# Patient Record
Sex: FEMALE | Race: BLACK OR AFRICAN AMERICAN | Employment: PART TIME | URBAN - METROPOLITAN AREA
[De-identification: names, ages, dates, MRNs, and addresses within clinical notes are randomized per-mention and may not be internally consistent; named-entity substitution may affect disease eponyms.]

---

## 2018-10-19 ENCOUNTER — OFFICE VISIT (OUTPATIENT)
Dept: INTERNAL MEDICINE CLINIC | Age: 38
End: 2018-10-19
Payer: COMMERCIAL

## 2018-10-19 VITALS
OXYGEN SATURATION: 98 % | TEMPERATURE: 98.2 F | RESPIRATION RATE: 16 BRPM | SYSTOLIC BLOOD PRESSURE: 132 MMHG | HEIGHT: 62 IN | WEIGHT: 164.4 LBS | BODY MASS INDEX: 30.25 KG/M2 | HEART RATE: 84 BPM | DIASTOLIC BLOOD PRESSURE: 76 MMHG

## 2018-10-19 DIAGNOSIS — K59.00 CONSTIPATION, UNSPECIFIED CONSTIPATION TYPE: ICD-10-CM

## 2018-10-19 DIAGNOSIS — Z13.9 ENCOUNTER FOR HEALTH-RELATED SCREENING: ICD-10-CM

## 2018-10-19 DIAGNOSIS — B00.1 COLD SORE: ICD-10-CM

## 2018-10-19 DIAGNOSIS — R61 HYPERHIDROSIS: ICD-10-CM

## 2018-10-19 DIAGNOSIS — K57.30 DIVERTICULOSIS OF LARGE INTESTINE WITHOUT HEMORRHAGE: ICD-10-CM

## 2018-10-19 DIAGNOSIS — L30.9 ECZEMA, UNSPECIFIED TYPE: Primary | ICD-10-CM

## 2018-10-19 PROCEDURE — 99203 OFFICE O/P NEW LOW 30 MIN: CPT | Performed by: INTERNAL MEDICINE

## 2018-10-19 RX ORDER — ACYCLOVIR 800 MG/1
800 TABLET ORAL 2 TIMES DAILY
Qty: 10 TABLET | Refills: 5 | Status: SHIPPED | OUTPATIENT
Start: 2018-10-19 | End: 2019-10-30 | Stop reason: SDUPTHER

## 2018-10-19 RX ORDER — ACYCLOVIR 50 MG/G
OINTMENT TOPICAL
Qty: 30 G | Refills: 1 | Status: SHIPPED | OUTPATIENT
Start: 2018-10-19 | End: 2021-09-27

## 2018-10-19 ASSESSMENT — ENCOUNTER SYMPTOMS
FLATUS: 0
VOMITING: 0
RESPIRATORY NEGATIVE: 1
NAUSEA: 0
DIARRHEA: 0
ABDOMINAL PAIN: 1
CONSTIPATION: 1
EYES NEGATIVE: 1
BACK PAIN: 1
BLOATING: 1
ALLERGIC/IMMUNOLOGIC NEGATIVE: 1
HEMATOCHEZIA: 0
RECTAL PAIN: 0

## 2018-10-19 ASSESSMENT — PATIENT HEALTH QUESTIONNAIRE - PHQ9
SUM OF ALL RESPONSES TO PHQ9 QUESTIONS 1 & 2: 0
2. FEELING DOWN, DEPRESSED OR HOPELESS: 0
SUM OF ALL RESPONSES TO PHQ QUESTIONS 1-9: 0
1. LITTLE INTEREST OR PLEASURE IN DOING THINGS: 0
SUM OF ALL RESPONSES TO PHQ QUESTIONS 1-9: 0

## 2018-10-19 NOTE — PATIENT INSTRUCTIONS
Patient Education        Learning About Diverticulosis and Diverticulitis  What are diverticulosis and diverticulitis? In diverticulosis and diverticulitis, pouches called diverticula form in the wall of the large intestine, or colon. · In diverticulosis, the pouches do not cause any pain or other symptoms. · In diverticulitis, the pouches get inflamed or infected and cause symptoms. Doctors aren't sure what causes these pouches in the colon. But they think that a low-fiber diet may play a role. Without fiber to add bulk to the stool, the colon has to work harder than normal to push the stool forward. The pressure from this may cause pouches to form in weak spots along the colon. Some people with diverticulosis get diverticulitis. But experts don't know why this happens. What are the symptoms? · In diverticulosis, most people don't have symptoms. But pouches sometimes bleed. · In diverticulitis, symptoms may last from a few hours to a week or more. They include:  ¨ Belly pain. This is usually in the lower left side. It is sometimes worse when you move. This is the most common symptom. ¨ Fever and chills. ¨ Bloating and gas. ¨ Diarrhea or constipation. ¨ Nausea and sometimes vomiting. ¨ Not feeling like eating. How can you prevent these problems? You may be able to lower your chance of getting diverticulitis. You can do this by taking steps to prevent constipation. · Eat fruits, vegetables, beans, and whole grains every day. These foods are high in fiber. · Drink plenty of fluids (enough so that your urine is light yellow or clear like water). If you have kidney, heart, or liver disease and have to limit fluids, talk with your doctor before you increase the amount of fluids you drink. · Get at least 30 minutes of exercise on most days of the week. Walking is a good choice. You also may want to do other activities, such as running, swimming, cycling, or playing tennis or team sports.   · Take a bleeding or blood-clotting disorder;  · diabetes;  · a gastrointestinal obstruction;  · a hormone sensitive cancer or condition;  · high triglyceride levels; or  · high or low blood pressure. Flax is considered likely unsafe to use during pregnancy. It is not known whether flax passes into breast milk or if it could harm a nursing baby. Do not use this product without medical advice if you are breast-feeding a baby. Do not give any herbal/health supplement to a child without medical advice. How should I take flax? When considering the use of herbal supplements, seek the advice of your doctor. You may also consider consulting a practitioner who is trained in the use of herbal/health supplements. If you choose to use flax, use it as directed on the package or as directed by your doctor, pharmacist, or other healthcare provider. Do not use more of this product than is recommended on the label. Do not use different formulations of flax (such as tablets, liquids, and others) at the same time, unless specifically directed to do so by a health care professional. Using different formulations together increases the risk of an overdose. Call your doctor if the condition you are treating with flax does not improve, or if it gets worse while using this product. Flax can affect blood-clotting and may increase your risk of bleeding. If you need surgery, dental work, or a medical procedure, stop taking flax at least 2 weeks ahead of time. Store at room temperature away from moisture and heat. Drink plenty of water each day while you are taking this product. What happens if I miss a dose? Skip the missed dose if it is almost time for your next scheduled dose. Do not use extra flax to make up the missed dose. What happens if I overdose? Seek emergency medical attention or call the Poison Help line at 1-108.810.1866. What should I avoid while taking flax?   Follow your healthcare provider's instructions about any allergic reactions, or adverse effects. If you have questions about the drugs you are taking, check with your doctor, nurse or pharmacist.  Copyright 0334-3759 [address]. Version: 5.01. Revision date: 8/3/2015. Care instructions adapted under license by [facility] ([facility]). If you have questions about a medical condition or this instruction, always ask your healthcare professional. [place] [address] any warranty or liability for your use of this information.

## 2018-10-22 LAB
HERPES TYPE 1/2 IGM COMBINED: 0.64 IV
HERPES TYPE I/II IGG COMBINED: >22.4 IV

## 2018-10-25 ENCOUNTER — PATIENT MESSAGE (OUTPATIENT)
Dept: INTERNAL MEDICINE CLINIC | Age: 38
End: 2018-10-25

## 2018-10-31 NOTE — TELEPHONE ENCOUNTER
Last OV   10/19/2018   New Pt visit       Dr Deno Dakin I see Karoline colace but no mirlax on med sheet

## 2018-11-01 RX ORDER — POLYETHYLENE GLYCOL 3350 17 G/17G
17 POWDER, FOR SOLUTION ORAL DAILY
Qty: 510 G | Refills: 11 | Status: SHIPPED | OUTPATIENT
Start: 2018-11-01 | End: 2018-12-01

## 2018-12-07 ENCOUNTER — TELEPHONE (OUTPATIENT)
Dept: INTERNAL MEDICINE CLINIC | Age: 38
End: 2018-12-07

## 2019-01-04 ENCOUNTER — OFFICE VISIT (OUTPATIENT)
Dept: INTERNAL MEDICINE CLINIC | Age: 39
End: 2019-01-04
Payer: COMMERCIAL

## 2019-01-04 VITALS
OXYGEN SATURATION: 98 % | SYSTOLIC BLOOD PRESSURE: 116 MMHG | HEART RATE: 86 BPM | DIASTOLIC BLOOD PRESSURE: 78 MMHG | BODY MASS INDEX: 30.36 KG/M2 | WEIGHT: 166 LBS | TEMPERATURE: 98 F

## 2019-01-04 DIAGNOSIS — J01.01 ACUTE RECURRENT MAXILLARY SINUSITIS: Primary | ICD-10-CM

## 2019-01-04 PROCEDURE — 99213 OFFICE O/P EST LOW 20 MIN: CPT | Performed by: INTERNAL MEDICINE

## 2019-01-04 RX ORDER — CEFDINIR 300 MG/1
300 CAPSULE ORAL 2 TIMES DAILY
Qty: 20 CAPSULE | Refills: 0 | Status: SHIPPED | OUTPATIENT
Start: 2019-01-04 | End: 2019-01-14

## 2019-01-04 RX ORDER — FLUCONAZOLE 150 MG/1
150 TABLET ORAL ONCE
Qty: 3 TABLET | Refills: 0 | Status: SHIPPED | OUTPATIENT
Start: 2019-01-04 | End: 2019-01-04

## 2019-01-04 RX ORDER — FLUTICASONE PROPIONATE 50 MCG
2 SPRAY, SUSPENSION (ML) NASAL DAILY
Qty: 1 BOTTLE | Refills: 2 | Status: SHIPPED | OUTPATIENT
Start: 2019-01-04 | End: 2019-07-30 | Stop reason: SDUPTHER

## 2019-01-04 ASSESSMENT — ENCOUNTER SYMPTOMS
VOMITING: 0
SORE THROAT: 0
ABDOMINAL PAIN: 0
DIARRHEA: 0
RHINORRHEA: 0
CONSTIPATION: 0
COUGH: 1
SHORTNESS OF BREATH: 0
SINUS PRESSURE: 1
BACK PAIN: 0
WHEEZING: 0
CHEST TIGHTNESS: 0
NAUSEA: 0
EYE REDNESS: 0

## 2019-06-06 DIAGNOSIS — L30.9 ECZEMA, UNSPECIFIED TYPE: ICD-10-CM

## 2019-07-30 ENCOUNTER — OFFICE VISIT (OUTPATIENT)
Dept: INTERNAL MEDICINE CLINIC | Age: 39
End: 2019-07-30
Payer: COMMERCIAL

## 2019-07-30 VITALS
HEIGHT: 61 IN | WEIGHT: 156 LBS | BODY MASS INDEX: 29.45 KG/M2 | SYSTOLIC BLOOD PRESSURE: 126 MMHG | OXYGEN SATURATION: 98 % | HEART RATE: 68 BPM | DIASTOLIC BLOOD PRESSURE: 78 MMHG

## 2019-07-30 DIAGNOSIS — Z02.0 SCHOOL PHYSICAL EXAM: Primary | ICD-10-CM

## 2019-07-30 DIAGNOSIS — J01.01 ACUTE RECURRENT MAXILLARY SINUSITIS: ICD-10-CM

## 2019-07-30 DIAGNOSIS — L30.9 ECZEMA, UNSPECIFIED TYPE: ICD-10-CM

## 2019-07-30 PROCEDURE — 99396 PREV VISIT EST AGE 40-64: CPT | Performed by: NURSE PRACTITIONER

## 2019-07-30 RX ORDER — FLUTICASONE PROPIONATE 50 MCG
2 SPRAY, SUSPENSION (ML) NASAL DAILY
Qty: 1 BOTTLE | Refills: 2 | Status: SHIPPED | OUTPATIENT
Start: 2019-07-30 | End: 2021-02-02 | Stop reason: SDUPTHER

## 2019-07-30 ASSESSMENT — PATIENT HEALTH QUESTIONNAIRE - PHQ9
1. LITTLE INTEREST OR PLEASURE IN DOING THINGS: 0
SUM OF ALL RESPONSES TO PHQ QUESTIONS 1-9: 0
SUM OF ALL RESPONSES TO PHQ9 QUESTIONS 1 & 2: 0
SUM OF ALL RESPONSES TO PHQ QUESTIONS 1-9: 0
2. FEELING DOWN, DEPRESSED OR HOPELESS: 0

## 2019-07-30 ASSESSMENT — ENCOUNTER SYMPTOMS
EYES NEGATIVE: 1
GASTROINTESTINAL NEGATIVE: 1
ALLERGIC/IMMUNOLOGIC NEGATIVE: 1
RESPIRATORY NEGATIVE: 1

## 2019-07-30 NOTE — PROGRESS NOTES
Psychiatric: She has a normal mood and affect.  Her speech is normal and behavior is normal. Judgment and thought content normal. Cognition and memory are normal.       Assessment:      Well exam  Immunization check      Plan:     Continue to plenty of water  Avoid fast food          [address] [facility], [facility]

## 2019-08-01 LAB
RUBEOLA (MEASLES) AB IGG: 221 AU/ML
RUBEOLA IGM: 0.39 AU (ref 0–0.79)

## 2019-08-02 LAB
6-ACETYLMORPHINE: NOT DETECTED
7-AMINOCLONAZEPAM: NOT DETECTED
ALPHA-OH-ALPRAZOLAM: NOT DETECTED
ALPRAZOLAM: NOT DETECTED
AMPHETAMINE: NOT DETECTED
BARBITURATES: NOT DETECTED
BENZOYLECGONINE: NOT DETECTED
BUPRENORPHINE: NOT DETECTED
CARISOPRODOL: NOT DETECTED
CLONAZEPAM: NOT DETECTED
CODEINE: NOT DETECTED
CREATININE URINE: 256.1 MG/DL (ref 20–400)
DIAZEPAM: NOT DETECTED
DRUGS EXPECTED: NORMAL
EER PAIN MGT DRUG PANEL, HIGH RES/EMIT U: NORMAL
ETHYL GLUCURONIDE: PRESENT
FENTANYL: NOT DETECTED
HYDROCODONE: NOT DETECTED
HYDROMORPHONE: NOT DETECTED
LORAZEPAM: NOT DETECTED
MARIJUANA METABOLITE: NOT DETECTED
MDA: NOT DETECTED
MDEA: NOT DETECTED
MDMA URINE: NOT DETECTED
MEPERIDINE: NOT DETECTED
METHADONE: NOT DETECTED
METHAMPHETAMINE: NOT DETECTED
METHYLPHENIDATE: NOT DETECTED
MIDAZOLAM: NOT DETECTED
MORPHINE: NOT DETECTED
NORBUPRENORPHINE, FREE: NOT DETECTED
NORDIAZEPAM: NOT DETECTED
NORFENTANYL: NOT DETECTED
NORHYDROCODONE, URINE: NOT DETECTED
NOROXYCODONE: NOT DETECTED
NOROXYMORPHONE, URINE: NOT DETECTED
OXAZEPAM: NOT DETECTED
OXYCODONE: NOT DETECTED
OXYMORPHONE: NOT DETECTED
PAIN MANAGEMENT DRUG PANEL: NORMAL
PAIN MANAGEMENT DRUG PANEL: NORMAL
PCP: NOT DETECTED
PHENTERMINE: NOT DETECTED
PROPOXYPHENE: NOT DETECTED
TAPENTADOL, URINE: NOT DETECTED
TAPENTADOL-O-SULFATE, URINE: NOT DETECTED
TEMAZEPAM: NOT DETECTED
TRAMADOL: NOT DETECTED
ZOLPIDEM: NOT DETECTED

## 2019-08-08 ENCOUNTER — TELEPHONE (OUTPATIENT)
Dept: INTERNAL MEDICINE CLINIC | Age: 39
End: 2019-08-08

## 2019-08-08 DIAGNOSIS — Z01.84 IMMUNITY STATUS TESTING: Primary | ICD-10-CM

## 2019-08-12 DIAGNOSIS — Z01.84 IMMUNITY STATUS TESTING: ICD-10-CM

## 2019-08-12 LAB — HEPATITIS B CORE IGM ANTIBODY: NORMAL

## 2019-08-20 DIAGNOSIS — Z01.84 IMMUNITY STATUS TESTING: Primary | ICD-10-CM

## 2019-08-21 DIAGNOSIS — Z01.84 IMMUNITY STATUS TESTING: ICD-10-CM

## 2019-08-24 LAB — VARICELLA ZOSTER AB IGM: 0.13 ISR

## 2019-10-30 ENCOUNTER — PATIENT MESSAGE (OUTPATIENT)
Dept: INTERNAL MEDICINE CLINIC | Age: 39
End: 2019-10-30

## 2019-10-30 DIAGNOSIS — B00.1 COLD SORE: ICD-10-CM

## 2019-10-30 DIAGNOSIS — L30.9 ECZEMA, UNSPECIFIED TYPE: ICD-10-CM

## 2019-10-30 RX ORDER — ACYCLOVIR 800 MG/1
800 TABLET ORAL 2 TIMES DAILY
Qty: 10 TABLET | Refills: 5 | Status: SHIPPED | OUTPATIENT
Start: 2019-10-30 | End: 2019-11-04

## 2019-12-09 ENCOUNTER — TELEPHONE (OUTPATIENT)
Dept: INTERNAL MEDICINE CLINIC | Age: 39
End: 2019-12-09

## 2020-06-24 ENCOUNTER — NURSE TRIAGE (OUTPATIENT)
Dept: OTHER | Facility: CLINIC | Age: 40
End: 2020-06-24

## 2020-06-24 ENCOUNTER — OFFICE VISIT (OUTPATIENT)
Dept: PRIMARY CARE CLINIC | Age: 40
End: 2020-06-24

## 2020-06-24 PROCEDURE — 99211 OFF/OP EST MAY X REQ PHY/QHP: CPT | Performed by: INTERNAL MEDICINE

## 2020-06-24 NOTE — PATIENT INSTRUCTIONS
Advance Care Planning  People with COVID-19 may have no symptoms, mild symptoms, such as fever, cough, and shortness of breath or they may have more severe illness, developing severe and fatal pneumonia. As a result, Advance Care Planning with attention to naming a health care decision maker (someone you trust to make healthcare decisions for you if you could not speak for yourself) and sharing other health care preferences is important BEFORE a possible health crisis. Please contact your Primary Care Provider to discuss Advance Care Planning. Preventing the Spread of Coronavirus Disease 2019 in Homes and Residential Communities  For the most recent information go to [email or web address]    Prevention steps for People with confirmed or suspected COVID-19 (including persons under investigation) who do not need to be hospitalized  and   People with confirmed COVID-19 who were hospitalized and determined to be medically stable to go home    Your healthcare provider and public health staff will evaluate whether you can be cared for at home. If it is determined that you do not need to be hospitalized and can be isolated at home, you will be monitored by staff from your local or state health department. You should follow the prevention steps below until a healthcare provider or local or state health department says you can return to your normal activities. Stay home except to get medical care  People who are mildly ill with COVID-19 are able to isolate at home during their illness. You should restrict activities outside your home, except for getting medical care. Do not go to work, school, or public areas. Avoid using public transportation, ride-sharing, or taxis. Separate yourself from other people and animals in your home  People: As much as possible, you should stay in a specific room and away from other people in your home.  Also, you should use a separate bathroom, if available. Animals: You should restrict contact with pets and other animals while you are sick with COVID-19, just like you would around other people. Although there have not been reports of pets or other animals becoming sick with COVID-19, it is still recommended that people sick with COVID-19 limit contact with animals until more information is known about the virus. When possible, have another member of your household care for your animals while you are sick. If you are sick with COVID-19, avoid contact with your pet, including petting, snuggling, being kissed or licked, and sharing food. If you must care for your pet or be around animals while you are sick, wash your hands before and after you interact with pets and wear a facemask. Call ahead before visiting your doctor  If you have a medical appointment, call the healthcare provider and tell them that you have or may have COVID-19. This will help the healthcare providers office take steps to keep other people from getting infected or exposed. Wear a facemask  You should wear a facemask when you are around other people (e.g., sharing a room or vehicle) or pets and before you enter a healthcare providers office. If you are not able to wear a facemask (for example, because it causes trouble breathing), then people who live with you should not stay in the same room with you, or they should wear a facemask if they enter your room. Cover your coughs and sneezes  Cover your mouth and nose with a tissue when you cough or sneeze. Throw used tissues in a lined trash can. Immediately wash your hands with soap and water for at least 20 seconds or, if soap and water are not available, clean your hands with an alcohol-based hand  that contains at least 60% alcohol.   Clean your hands often  Wash your hands often with soap and water for at least 20 seconds, especially after blowing your nose, coughing, or sneezing; going to the bathroom; and have a medical emergency and need to call 911, notify the dispatch personnel that you have, or are being evaluated for COVID-19. If possible, put on a facemask before emergency medical services arrive. Discontinuing home isolation  Patients with confirmed COVID-19 should remain under home isolation precautions until the risk of secondary transmission to others is thought to be low. The decision to discontinue home isolation precautions should be made on a case-by-case basis, in consultation with healthcare providers and state and local health departments. Thank you for enrolling in [address] Please follow the instructions below to securely access your online medical record. [employer] allows you to send messages to your doctor, view your test results, renew your prescriptions, schedule appointments, and more. How Do I Sign Up? 1. In your Internet browser, go to [email or web address]  2. Click on the Sign Up Now link in the Sign In box. You will see the New Member Sign Up page. 3. Enter your [employer] Access Code exactly as it appears below. You will not need to use this code after youve completed the sign-up process. If you do not sign up before the expiration date, you must request a new code. [employer] Access Code: Activation code not generated  Current [employer] Status: Active    4. Enter your Social Security Number ([id number]) and Date of Birth (mm/dd/yyyy) as indicated and click Submit. You will be taken to the next sign-up page. 5. Create a [employer] ID. This will be your [employer] login ID and cannot be changed, so think of one that is secure and easy to remember. 6. Create a [employer] password. You can change your password at any time. 7. Enter your Password Reset Question and Answer. This can be used at a later time if you forget your password. 8. Enter your e-mail address. You will receive e-mail notification when new information is available in [address] 9. Click Sign Up. You can now view your medical record. Additional Information  If you have questions, please contact your physician practice where you receive care. Remember, [employer] is NOT to be used for urgent needs. For medical emergencies, dial 911.

## 2020-06-26 LAB
SARS-COV-2: NOT DETECTED
SOURCE: NORMAL

## 2021-02-02 ENCOUNTER — OFFICE VISIT (OUTPATIENT)
Dept: INTERNAL MEDICINE CLINIC | Age: 41
End: 2021-02-02
Payer: COMMERCIAL

## 2021-02-02 VITALS
OXYGEN SATURATION: 99 % | HEIGHT: 62 IN | WEIGHT: 170 LBS | HEART RATE: 93 BPM | DIASTOLIC BLOOD PRESSURE: 76 MMHG | TEMPERATURE: 97.8 F | BODY MASS INDEX: 31.28 KG/M2 | SYSTOLIC BLOOD PRESSURE: 132 MMHG

## 2021-02-02 DIAGNOSIS — I73.00 RAYNAUD'S PHENOMENON WITHOUT GANGRENE: ICD-10-CM

## 2021-02-02 DIAGNOSIS — B00.1 RECURRENT COLD SORES: ICD-10-CM

## 2021-02-02 DIAGNOSIS — Z13.220 SCREENING FOR HYPERLIPIDEMIA: ICD-10-CM

## 2021-02-02 DIAGNOSIS — Z23 NEED FOR PROPHYLACTIC VACCINATION AGAINST STREPTOCOCCUS PNEUMONIAE (PNEUMOCOCCUS): ICD-10-CM

## 2021-02-02 DIAGNOSIS — Z00.00 WELL ADULT EXAM: Primary | ICD-10-CM

## 2021-02-02 DIAGNOSIS — Z12.31 ENCOUNTER FOR SCREENING MAMMOGRAM FOR BREAST CANCER: ICD-10-CM

## 2021-02-02 DIAGNOSIS — Z13.1 ENCOUNTER FOR SCREENING FOR DIABETES MELLITUS: ICD-10-CM

## 2021-02-02 DIAGNOSIS — Z11.4 SCREENING FOR HIV WITHOUT PRESENCE OF RISK FACTORS: ICD-10-CM

## 2021-02-02 DIAGNOSIS — R61 HYPERHIDROSIS: ICD-10-CM

## 2021-02-02 DIAGNOSIS — Z72.89 OTHER PROBLEMS RELATED TO LIFESTYLE: ICD-10-CM

## 2021-02-02 DIAGNOSIS — L30.9 ECZEMA, UNSPECIFIED TYPE: ICD-10-CM

## 2021-02-02 DIAGNOSIS — J01.01 ACUTE RECURRENT MAXILLARY SINUSITIS: ICD-10-CM

## 2021-02-02 LAB
BASOPHILS ABSOLUTE: 0 K/UL (ref 0–0.2)
BASOPHILS RELATIVE PERCENT: 0.2 %
CHOLESTEROL, TOTAL: 199 MG/DL (ref 0–199)
EOSINOPHILS ABSOLUTE: 0.1 K/UL (ref 0–0.6)
EOSINOPHILS RELATIVE PERCENT: 1.3 %
GLUCOSE FASTING: 86 MG/DL (ref 70–99)
HCT VFR BLD CALC: 42.8 % (ref 36–48)
HDLC SERPL-MCNC: 67 MG/DL (ref 40–60)
HEMOGLOBIN: 14.2 G/DL (ref 12–16)
HEPATITIS C ANTIBODY INTERPRETATION: NORMAL
LDL CHOLESTEROL CALCULATED: 119 MG/DL
LYMPHOCYTES ABSOLUTE: 2.6 K/UL (ref 1–5.1)
LYMPHOCYTES RELATIVE PERCENT: 29.9 %
MCH RBC QN AUTO: 33.7 PG (ref 26–34)
MCHC RBC AUTO-ENTMCNC: 33.1 G/DL (ref 31–36)
MCV RBC AUTO: 101.9 FL (ref 80–100)
MONOCYTES ABSOLUTE: 0.6 K/UL (ref 0–1.3)
MONOCYTES RELATIVE PERCENT: 6.4 %
NEUTROPHILS ABSOLUTE: 5.4 K/UL (ref 1.7–7.7)
NEUTROPHILS RELATIVE PERCENT: 62.2 %
PDW BLD-RTO: 13.6 % (ref 12.4–15.4)
PLATELET # BLD: 292 K/UL (ref 135–450)
PMV BLD AUTO: 8.8 FL (ref 5–10.5)
RBC # BLD: 4.2 M/UL (ref 4–5.2)
TRIGL SERPL-MCNC: 63 MG/DL (ref 0–150)
TSH REFLEX FT4: 0.82 UIU/ML (ref 0.27–4.2)
VLDLC SERPL CALC-MCNC: 13 MG/DL
WBC # BLD: 8.7 K/UL (ref 4–11)

## 2021-02-02 PROCEDURE — 99213 OFFICE O/P EST LOW 20 MIN: CPT | Performed by: INTERNAL MEDICINE

## 2021-02-02 PROCEDURE — 99396 PREV VISIT EST AGE 40-64: CPT | Performed by: INTERNAL MEDICINE

## 2021-02-02 RX ORDER — ACYCLOVIR 800 MG/1
800 TABLET ORAL 2 TIMES DAILY
Qty: 20 TABLET | Refills: 0 | Status: SHIPPED | OUTPATIENT
Start: 2021-02-02 | End: 2021-09-27

## 2021-02-02 RX ORDER — AMLODIPINE BESYLATE 5 MG/1
5 TABLET ORAL DAILY
Qty: 30 TABLET | Refills: 0 | Status: SHIPPED | OUTPATIENT
Start: 2021-02-02 | End: 2021-03-10

## 2021-02-02 RX ORDER — ACYCLOVIR 800 MG/1
800 TABLET ORAL 2 TIMES DAILY
COMMUNITY
End: 2021-02-02 | Stop reason: SDUPTHER

## 2021-02-02 RX ORDER — FLUTICASONE PROPIONATE 50 MCG
2 SPRAY, SUSPENSION (ML) NASAL DAILY
Qty: 1 BOTTLE | Refills: 2 | Status: SHIPPED | OUTPATIENT
Start: 2021-02-02 | End: 2021-09-27

## 2021-02-02 SDOH — SOCIAL STABILITY: SOCIAL NETWORK: HOW OFTEN DO YOU ATTEND CHURCH OR RELIGIOUS SERVICES?: 1 TO 4 TIMES PER YEAR

## 2021-02-02 SDOH — SOCIAL STABILITY: SOCIAL NETWORK: ARE YOU MARRIED, WIDOWED, DIVORCED, SEPARATED, NEVER MARRIED, OR LIVING WITH A PARTNER?: MARRIED

## 2021-02-02 SDOH — ECONOMIC STABILITY: TRANSPORTATION INSECURITY
IN THE PAST 12 MONTHS, HAS LACK OF TRANSPORTATION KEPT YOU FROM MEETINGS, WORK, OR FROM GETTING THINGS NEEDED FOR DAILY LIVING?: NO

## 2021-02-02 SDOH — ECONOMIC STABILITY: INCOME INSECURITY: HOW HARD IS IT FOR YOU TO PAY FOR THE VERY BASICS LIKE FOOD, HOUSING, MEDICAL CARE, AND HEATING?: NOT HARD AT ALL

## 2021-02-02 SDOH — HEALTH STABILITY: MENTAL HEALTH
STRESS IS WHEN SOMEONE FEELS TENSE, NERVOUS, ANXIOUS, OR CAN'T SLEEP AT NIGHT BECAUSE THEIR MIND IS TROUBLED. HOW STRESSED ARE YOU?: TO SOME EXTENT

## 2021-02-02 SDOH — SOCIAL STABILITY: SOCIAL NETWORK: HOW OFTEN DO YOU ATTENT MEETINGS OF THE CLUB OR ORGANIZATION YOU BELONG TO?: 1 TO 4 TIMES PER YEAR

## 2021-02-02 SDOH — SOCIAL STABILITY: SOCIAL INSECURITY
WITHIN THE LAST YEAR, HAVE YOU BEEN KICKED, HIT, SLAPPED, OR OTHERWISE PHYSICALLY HURT BY YOUR PARTNER OR EX-PARTNER?: NO

## 2021-02-02 SDOH — HEALTH STABILITY: PHYSICAL HEALTH: ON AVERAGE, HOW MANY MINUTES DO YOU ENGAGE IN EXERCISE AT THIS LEVEL?: 60 MIN

## 2021-02-02 SDOH — ECONOMIC STABILITY: FOOD INSECURITY: WITHIN THE PAST 12 MONTHS, THE FOOD YOU BOUGHT JUST DIDN'T LAST AND YOU DIDN'T HAVE MONEY TO GET MORE.: NEVER TRUE

## 2021-02-02 SDOH — HEALTH STABILITY: PHYSICAL HEALTH: ON AVERAGE, HOW MANY DAYS PER WEEK DO YOU ENGAGE IN MODERATE TO STRENUOUS EXERCISE (LIKE A BRISK WALK)?: 5 DAYS

## 2021-02-02 SDOH — ECONOMIC STABILITY: FOOD INSECURITY: WITHIN THE PAST 12 MONTHS, YOU WORRIED THAT YOUR FOOD WOULD RUN OUT BEFORE YOU GOT MONEY TO BUY MORE.: NEVER TRUE

## 2021-02-02 ASSESSMENT — PATIENT HEALTH QUESTIONNAIRE - PHQ9
SUM OF ALL RESPONSES TO PHQ QUESTIONS 1-9: 0
SUM OF ALL RESPONSES TO PHQ QUESTIONS 1-9: 0

## 2021-02-02 ASSESSMENT — ENCOUNTER SYMPTOMS
EYES NEGATIVE: 1
ALLERGIC/IMMUNOLOGIC NEGATIVE: 1
RESPIRATORY NEGATIVE: 1
ABDOMINAL PAIN: 1

## 2021-02-02 NOTE — PROGRESS NOTES
Subjective:      Patient ID: Leti Alba is a 38 y.o. female. Well Adult Physical   Patient here for a comprehensive physical exam.The patient reports problems -  has a past medical history of Diverticulosis and Hypertension. Leti Alba is a 38 y.o. female  evaluation of complaint of pain in her bilateral lower extremities. Symptoms have been present for several months. Symptoms include numbness, pain and toes change colors  and hands are not affected are of moderate severity. Onset was gradual.  Patient denies numbness and pain. Symptoms are made worse by: cold exposure. Symptoms are helped by heat and movement and heavy objects. Associated symptoms include none. Patient denies associated arthralgia, depression, fatigue, fevers, joint pain, memory loss, morning stiffness, oral ulcers and polyuria. Patient taking medications thought to exacerbate Raynaud's: none. Do you take any herbs or supplements that were not prescribed by a doctor? yes Are you taking calcium supplements? no Are you taking aspirin daily? no   History:  Pap smear schedule for this month.     Past Medical History:   Diagnosis Date    Diverticulosis     Hypertension      Past Surgical History:   Procedure Laterality Date     SECTION      OTHER SURGICAL HISTORY      had mirena placed which ruptured from the uterus and had surgery to remove through belly botton     Family History   Problem Relation Age of Onset    High Blood Pressure Mother     High Cholesterol Mother     Other Mother         thyroid problens    High Blood Pressure Father     High Cholesterol Father     High Blood Pressure Sister     High Cholesterol Sister     Depression Sister     Diabetes Brother     Kidney Disease Brother     Other Paternal Grandfather         alzheimers    Heart Disease Maternal Grandfather      Social History     Socioeconomic History    Marital status: Single     Spouse name: Not on file    Number of children: Not on file    Years of education: Not on file    Highest education level: Not on file   Occupational History    Occupation: [employer]   Social Needs    Financial resource strain: Not on file    Food insecurity     Worry: Not on file     Inability: Not on file   [employer] needs     Medical: Not on file     Non-medical: Not on file   Tobacco Use    Smoking status: Never Smoker    Smokeless tobacco: Never Used   Substance and Sexual Activity    Alcohol use: Yes     Comment: occasionally    Drug use: No    Sexual activity: Yes     Partners: Male     Birth control/protection: OCP   Lifestyle    Physical activity     Days per week: Not on file     Minutes per session: Not on file    Stress: Not on file   Relationships    Social connections     Talks on phone: Not on file     Gets together: Not on file     Attends [employer] service: Not on file     Active member of club or organization: Not on file     Attends meetings of clubs or organizations: Not on file     Relationship status: Not on file    Intimate partner violence     Fear of current or ex partner: Not on file     Emotionally abused: Not on file     Physically abused: Not on file     Forced sexual activity: Not on file   Other Topics Concern    Not on file   Social History Narrative    Lives home with  and 3 babies ( daughter 8,  Son 9, 20 month son). Review of Systems   Constitutional: Negative. Negative for diaphoresis and fatigue. HENT: Negative. Eyes: Negative. Respiratory: Negative. Cardiovascular: Negative. Gastrointestinal: Positive for abdominal pain. Endocrine: Negative. Genitourinary: Negative. Negative for difficulty urinating, dyspareunia, dysuria, enuresis, flank pain, frequency, genital sores, hematuria and menstrual problem. Musculoskeletal: Positive for gait problem and joint swelling. Skin: Negative. Allergic/Immunologic: Negative. Hematological: Negative. Psychiatric/Behavioral: Negative. Vitals:    02/02/21 1005   BP: 132/76   Pulse: 93   Temp: 97.8 °F (36.6 °C)   SpO2: 99%     . BP Readings from Last 3 Encounters:   02/02/21 132/76   07/30/19 126/78   01/04/19 116/78     Wt Readings from Last 3 Encounters:   02/02/21 170 lb (77.1 kg)   07/30/19 156 lb (70.8 kg)   01/04/19 166 lb (75.3 kg)     Past Medical History:   Diagnosis Date    Diverticulosis     Hypertension      Family History   Problem Relation Age of Onset    High Blood Pressure Mother     High Cholesterol Mother     Other Mother         thyroid problens    High Blood Pressure Father     High Cholesterol Father     High Blood Pressure Sister     High Cholesterol Sister     Depression Sister     Diabetes Brother     Kidney Disease Brother     Other Paternal Grandfather         alzheimers    Heart Disease Maternal Grandfather      Objective:   Physical Exam  Constitutional:       Appearance: Normal appearance. She is well-developed. HENT:      Head: Normocephalic. Right Ear: Hearing, tympanic membrane, ear canal and external ear normal.      Left Ear: Hearing, tympanic membrane, ear canal and external ear normal.      Nose: Nose normal.      Right Sinus: No maxillary sinus tenderness or frontal sinus tenderness. Left Sinus: No maxillary sinus tenderness or frontal sinus tenderness. Mouth/Throat:      Pharynx: Uvula midline. Eyes:      General: Lids are normal. No scleral icterus. Right eye: No foreign body, discharge or hordeolum. Left eye: No foreign body, discharge or hordeolum. Conjunctiva/sclera: Conjunctivae normal.      Right eye: No chemosis or exudate. Left eye: No chemosis or exudate. Pupils: Pupils are equal, round, and reactive to light. Neck:      Musculoskeletal: Full passive range of motion without pain. Thyroid: No thyroid mass or thyromegaly.       Trachea: Trachea normal.   Cardiovascular:      Rate and Rhythm: Normal rate and regular rhythm. Heart sounds: Normal heart sounds. Pulmonary:      Effort: Pulmonary effort is normal.      Breath sounds: Normal breath sounds. Abdominal:      General: Bowel sounds are normal.      Palpations: Abdomen is soft. There is no hepatomegaly or splenomegaly. Tenderness: There is abdominal tenderness in the right lower quadrant. Hernia: No hernia is present. There is no hernia in the ventral area or left inguinal area. Comments: tenderness   Musculoskeletal: Normal range of motion. Lymphadenopathy:      Head:      Right side of head: No submental, submandibular, tonsillar, preauricular, posterior auricular or occipital adenopathy. Left side of head: No submental, submandibular, tonsillar, preauricular, posterior auricular or occipital adenopathy. Cervical: No cervical adenopathy. Skin:     General: Skin is warm and dry. Neurological:      Mental Status: She is alert and oriented to person, place, and time. GCS: GCS eye subscore is 4. GCS verbal subscore is 5. GCS motor subscore is 6. Cranial Nerves: No cranial nerve deficit. Sensory: No sensory deficit. Deep Tendon Reflexes: Reflexes are normal and symmetric. Reflex Scores:       Tricep reflexes are 2+ on the right side and 2+ on the left side. Bicep reflexes are 2+ on the right side and 2+ on the left side. Brachioradialis reflexes are 2+ on the right side and 2+ on the left side. Patellar reflexes are 2+ on the right side and 2+ on the left side. Achilles reflexes are 2+ on the right side and 2+ on the left side. Psychiatric:         Speech: Speech normal.         Behavior: Behavior normal.         Thought Content:  Thought content normal.         Judgment: Judgment normal.           .Assessment/Plan:  Brooke Murray was seen today for annual exam.    Diagnoses and all orders for this visit:    Well adult exam-Anticipatory Guidance  Injury Prevention  Lap-shoulder belts, Smoke detectors, Carbon monoxide detectors, Safe storage and handling of firearms; removal if appropriate and  Occupational risk counseling  Substance Abuse  1. Tobacco cessation or never starting to include pharmacotherapy, social support for cessation, and skills training/problem solving. Avoid alcohol/drug use while driving, swimming, boating, using firearms, etc.   Sexual Behavior  1. STD prevention; abstinence; avoid high-risk behavior; condoms/female barrier with spermicide,  Contraception   Diet and Exercise   Limit fat and cholesterol; maintain caloric balance; emphasized grains, fruits and vegetables. Adequate calcium and vitamin D intake (females); add foods rich in calcium; supplement as needed. Regular physical activity at least 150 minutes per week to maintain activity   Protection from UV Light  Abuse and Violence: violence prevention at home, school and in social situations  Dental Health: Regular visits to dental health provider    Eczema, unspecified type  -     triamcinolone (KENALOG) 0.1 % ointment; APPLY TO AFFECTED AREA(S) TWO TIMES A DAY FOR ECZEMA    Acute recurrent maxillary sinusitis  -     fluticasone (FLONASE) 50 MCG/ACT nasal spray; 2 sprays by Each Nostril route daily    Hyperhidrosis  -     aluminum chloride (DRYSOL) 20 % external solution; Apply topically nightly. Other problems related to lifestyle  -     Hepatitis C Antibody; Future    Screening for HIV without presence of risk factors  -     HIV Screen; Future    Screening for hyperlipidemia  -     Lipid Panel; Future  -     Cancel: Lipid Panel; Future    Encounter for screening for diabetes mellitus  -     Glucose, Fasting;  Future  -     Hemoglobin A1C - NOT COVERED /DO NOT USE FOR MEDICARE PATIENTS; Future    Need for prophylactic vaccination against Streptococcus pneumoniae (pneumococcus)    Raynaud's phenomenon without gangrene  -     TSH WITH REFLEX TO FT4; Future  -     DAISHA Reflex to Antibody Cascade; Future  -     CBC WITH AUTO DIFFERENTIAL; Future  -     Cancel: DAISHA Reflex to Antibody Cascade; Future  -     amLODIPine (NORVASC) 5 MG tablet; Take 1 tablet by mouth daily    Recurrent cold sores  -     acyclovir (ZOVIRAX) 800 MG tablet; Take 1 tablet by mouth 2 times daily for 10 days    Encounter for screening mammogram for breast cancer  -     [facility] DIGITAL SCREEN W OR WO CAD BILATERAL; Future      Return in about 6 weeks (around 3/16/2021) for leg pain.       Lita Helms MD

## 2021-02-03 ENCOUNTER — TELEPHONE (OUTPATIENT)
Dept: INTERNAL MEDICINE CLINIC | Age: 41
End: 2021-02-03

## 2021-02-03 DIAGNOSIS — Z12.39 BREAST CANCER SCREENING, HIGH RISK PATIENT: Primary | ICD-10-CM

## 2021-02-03 LAB
ANTI-NUCLEAR ANTIBODY (ANA): NEGATIVE
ESTIMATED AVERAGE GLUCOSE: 96.8 MG/DL
HBA1C MFR BLD: 5 %
HIV AG/AB: NORMAL
HIV ANTIGEN: NORMAL
HIV-1 ANTIBODY: NORMAL
HIV-2 AB: NORMAL

## 2021-02-03 NOTE — TELEPHONE ENCOUNTER
Patient received an order for her first mammogram. She has 2 aunts that were diagnosed w/breast cancer last year.   She is asking for an order for a diagnostic instead of a screening mammogram.

## 2021-03-08 DIAGNOSIS — I73.00 RAYNAUD'S PHENOMENON WITHOUT GANGRENE: ICD-10-CM

## 2021-03-10 RX ORDER — AMLODIPINE BESYLATE 5 MG/1
TABLET ORAL
Qty: 30 TABLET | Refills: 5 | Status: SHIPPED
Start: 2021-03-10 | End: 2022-01-10 | Stop reason: CLARIF

## 2021-09-13 ENCOUNTER — TELEPHONE (OUTPATIENT)
Dept: INTERNAL MEDICINE CLINIC | Age: 41
End: 2021-09-13

## 2021-09-13 NOTE — TELEPHONE ENCOUNTER
Pt needs to schedule an appt as soon as possible for a rash on top of her foot that's getting worse. It's itchy, painful, and bleeding. Pt has tried aquaphor, rubbing it, wrapping it up, witch hazel, itch cream, and nothing is helping. 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

## 2021-09-13 NOTE — TELEPHONE ENCOUNTER
----- Message from [facility] sent at 9/9/2021 11:43 AM EDT -----  Subject: Appointment Request    Reason for Call: Semi-Routine Skin Problem    QUESTIONS  Type of Appointment? Established Patient  Reason for appointment request? Available appointments did not meet   patient need  Additional Information for Provider? Pt has a rash on both foot. would   like to be seen as soon as possible. VV will be okay.   ---------------------------------------------------------------------------  --------------  CALL BACK INFO  What is the best way for the office to contact you? OK to leave message on   voicemail  Preferred Call Back Phone Number? [phone]  ---------------------------------------------------------------------------  --------------  SCRIPT ANSWERS  Relationship to Patient? Self  Are you having swelling in your throat or face? No  Are you having difficulty breathing? No  Have the symptoms worsened or spread in the last day? No  Are you having fevers (100.4), chills or sweats? No  Have you recently (14 days) seen a provider for this issue? No  Have you been diagnosed with, awaiting test results for, or told that you   are suspected of having COVID-19 (Coronavirus)? (If patient has tested   negative or was tested as a requirement for work, school, or travel and   not based on symptoms, answer no)? No  Do you currently have flu-like symptoms including fever or chills, cough,   shortness of breath, difficulty breathing, or new loss of taste or smell? No  Have you had close contact with someone with COVID-19 in the last 14 days? No  (Service Expert  click yes below to proceed with [employer] As Usual   Scheduling)?  Yes

## 2021-09-13 NOTE — TELEPHONE ENCOUNTER
appt offered for 9/14/21 at 1445. Pt could not make this appt due to her job.   She is going to seek treatment at an urgent care

## 2021-09-26 DIAGNOSIS — B00.1 RECURRENT COLD SORES: ICD-10-CM

## 2021-09-26 DIAGNOSIS — J01.01 ACUTE RECURRENT MAXILLARY SINUSITIS: ICD-10-CM

## 2021-09-27 DIAGNOSIS — B00.1 COLD SORE: ICD-10-CM

## 2021-09-27 RX ORDER — ACYCLOVIR 50 MG/G
OINTMENT TOPICAL
Qty: 30 G | Refills: 1 | Status: SHIPPED | OUTPATIENT
Start: 2021-09-27 | End: 2021-10-19 | Stop reason: SDUPTHER

## 2021-09-27 RX ORDER — FLUTICASONE PROPIONATE 50 MCG
SPRAY, SUSPENSION (ML) NASAL
Qty: 16 G | Refills: 0 | Status: SHIPPED | OUTPATIENT
Start: 2021-09-27 | End: 2021-10-19 | Stop reason: SDUPTHER

## 2021-09-27 RX ORDER — ACYCLOVIR 800 MG/1
TABLET ORAL
Qty: 20 TABLET | Refills: 0 | Status: SHIPPED | OUTPATIENT
Start: 2021-09-27 | End: 2021-10-19 | Stop reason: SDUPTHER

## 2021-10-19 ENCOUNTER — OFFICE VISIT (OUTPATIENT)
Dept: INTERNAL MEDICINE CLINIC | Age: 41
End: 2021-10-19
Payer: COMMERCIAL

## 2021-10-19 VITALS
WEIGHT: 182 LBS | HEIGHT: 62 IN | DIASTOLIC BLOOD PRESSURE: 80 MMHG | BODY MASS INDEX: 33.49 KG/M2 | HEART RATE: 101 BPM | TEMPERATURE: 97.6 F | OXYGEN SATURATION: 98 % | SYSTOLIC BLOOD PRESSURE: 118 MMHG

## 2021-10-19 DIAGNOSIS — B00.1 RECURRENT COLD SORES: ICD-10-CM

## 2021-10-19 DIAGNOSIS — J01.01 ACUTE RECURRENT MAXILLARY SINUSITIS: ICD-10-CM

## 2021-10-19 DIAGNOSIS — B36.9 FUNGAL DERMATITIS: ICD-10-CM

## 2021-10-19 DIAGNOSIS — R61 HYPERHIDROSIS: ICD-10-CM

## 2021-10-19 DIAGNOSIS — L30.9 DERMATITIS: Primary | ICD-10-CM

## 2021-10-19 DIAGNOSIS — B00.1 COLD SORE: ICD-10-CM

## 2021-10-19 DIAGNOSIS — L30.9 ECZEMA, UNSPECIFIED TYPE: ICD-10-CM

## 2021-10-19 PROCEDURE — G8417 CALC BMI ABV UP PARAM F/U: HCPCS | Performed by: INTERNAL MEDICINE

## 2021-10-19 PROCEDURE — 1036F TOBACCO NON-USER: CPT | Performed by: INTERNAL MEDICINE

## 2021-10-19 PROCEDURE — G8484 FLU IMMUNIZE NO ADMIN: HCPCS | Performed by: INTERNAL MEDICINE

## 2021-10-19 PROCEDURE — G8427 DOCREV CUR MEDS BY ELIG CLIN: HCPCS | Performed by: INTERNAL MEDICINE

## 2021-10-19 PROCEDURE — 99214 OFFICE O/P EST MOD 30 MIN: CPT | Performed by: INTERNAL MEDICINE

## 2021-10-19 RX ORDER — ACYCLOVIR 50 MG/G
OINTMENT TOPICAL
Qty: 30 G | Refills: 1 | Status: SHIPPED | OUTPATIENT
Start: 2021-10-19

## 2021-10-19 RX ORDER — FLUTICASONE PROPIONATE 50 MCG
SPRAY, SUSPENSION (ML) NASAL
Qty: 16 G | Refills: 0 | Status: SHIPPED | OUTPATIENT
Start: 2021-10-19 | End: 2022-05-27

## 2021-10-19 RX ORDER — ACYCLOVIR 800 MG/1
TABLET ORAL
Qty: 20 TABLET | Refills: 0 | Status: SHIPPED | OUTPATIENT
Start: 2021-10-19 | End: 2022-01-10 | Stop reason: SDUPTHER

## 2021-10-19 RX ORDER — TERBINAFINE HYDROCHLORIDE 250 MG/1
250 TABLET ORAL DAILY
Qty: 14 TABLET | Refills: 0 | Status: SHIPPED | OUTPATIENT
Start: 2021-10-19 | End: 2021-11-02

## 2021-10-19 RX ORDER — CLOBETASOL PROPIONATE 0.5 MG/G
CREAM TOPICAL
Qty: 60 G | Refills: 1 | Status: SHIPPED | OUTPATIENT
Start: 2021-10-19

## 2021-10-19 ASSESSMENT — PATIENT HEALTH QUESTIONNAIRE - PHQ9
SUM OF ALL RESPONSES TO PHQ QUESTIONS 1-9: 0
2. FEELING DOWN, DEPRESSED OR HOPELESS: 0
SUM OF ALL RESPONSES TO PHQ QUESTIONS 1-9: 0
SUM OF ALL RESPONSES TO PHQ9 QUESTIONS 1 & 2: 0
1. LITTLE INTEREST OR PLEASURE IN DOING THINGS: 0
SUM OF ALL RESPONSES TO PHQ QUESTIONS 1-9: 0

## 2021-10-19 ASSESSMENT — ENCOUNTER SYMPTOMS
EYES NEGATIVE: 1
ALLERGIC/IMMUNOLOGIC NEGATIVE: 1
ABDOMINAL PAIN: 1
RESPIRATORY NEGATIVE: 1

## 2021-10-19 NOTE — PATIENT INSTRUCTIONS
Patient Education        Atopic Dermatitis: Care Instructions  Overview  Atopic dermatitis (also called eczema) is a skin problem that causes intense itching and a red, raised rash. In severe cases, the rash develops clear fluid-filled blisters. The rash is not contagious. You can't catch it from others. People with this condition seem to have very sensitive immune systems that are likely to react to things that cause allergies. The immune system is the body's way of fighting infection. There is no cure for atopic dermatitis. But you may be able to control it with care at home. Follow-up care is a key part of your treatment and safety. Be sure to make and go to all appointments, and call your doctor if you are having problems. It's also a good idea to know your test results and keep a list of the medicines you take. How can you care for yourself at home? · Use moisturizer at least twice a day. · If your doctor prescribes a cream, use it as directed. If your doctor prescribes other medicine, take it exactly as directed. · Wash the affected area with warm (not hot) water only. Soap can make dryness and itching worse. Pat dry. · Apply a moisturizer after bathing. Use a cream such as Cetaphil, Lubriderm, or Moisturel that does not irritate the skin or cause a rash. Apply the cream while your skin is still damp after lightly drying with a towel. · Use cold, wet cloths to reduce itching. · Keep cool, and stay out of the sun. · If itching affects your normal activities, an over-the-counter antihistamine, such as diphenhydramine (Benadryl) or loratadine (Claritin) may help. Read and follow all instructions on the label. · Control scratching. Keep your fingernails trimmed and smooth to prevent damage to the skin when you scratch it. Wearing cotton mittens or gloves can help you stop scratching. · Try to avoid things that trigger your rash.  These may include things like allergens, such as pollen or animal dander. Harsh soaps, scratchy clothes, stress, and some foods are other examples. When should you call for help? Call your doctor now or seek immediate medical care if:    · Your rash gets worse and you have a fever.     · You have new blisters or bruises, or the rash spreads and looks like a sunburn.     · You have signs of infection, such as:  ? Increased pain, swelling, warmth, or redness. ? Red streaks leading from the rash. ? Pus draining from the rash. ? A fever.     · You have crusting or oozing sores.     · You have joint aches or body aches along with your rash. Watch closely for changes in your health, and be sure to contact your doctor if:    · Your rash does not clear up after 2 to 3 weeks of home treatment.     · Itching interferes with your sleep or daily activities. Where can you learn more? Go to https://[email or web address] org and sign in to your [employer] account. Enter U386 in the [employer] box to learn more about \"Atopic Dermatitis: Care Instructions. \"     If you do not have an account, please click on the \"Sign Up Now\" link. Current as of: March 3, 2021               Content Version: 13.0  © 2006-2021 Healthwise, Incorporated. Care instructions adapted under license by [facility] ([facility]). If you have questions about a medical condition or this instruction, always ask your healthcare professional. [place] [address] any warranty or liability for your use of this information.

## 2021-10-19 NOTE — PROGRESS NOTES
Subjective:      Patient ID: Chirag Mann is a 42 y.o. female. Chief Complaint   Patient presents with    Rash     bilateral feet     Rash: Patient complains of rash involving the bilateral feet. Rash started 2 months ago. Appearance of rash at onset:  papular ;lesion and round flat lesion on entire foot, with papular lesions on her neck and interdigits of hand. She was treated with oral steroids x 1. The rash improved. And she now has a new presentation of rash on bilateral feet with itching. {Rash has not changed over time Initial distribution: bilateral feet. Discomfort associated with rash: is pruritic. Associated symptoms: none. Denies: none. Patient has had previous evaluation of rash. Patient has had previous treatment. Response to treatment: good rash resolved with oral steroids. Patient has not had contacts with similar rash. Patient has not identified precipitant. Patient has not had new exposures (soaps, lotions, laundry detergents, foods, medications, plants, insects or animals.)      Under arms with a small cyst. Likely previous abscess.       Past Medical History:   Diagnosis Date    Diverticulosis     Hypertension      Past Surgical History:   Procedure Laterality Date     SECTION      OTHER SURGICAL HISTORY      had mirena placed which ruptured from the uterus and had surgery to remove through belly botton     Family History   Problem Relation Age of Onset    High Blood Pressure Mother     High Cholesterol Mother     Other Mother         thyroid problens    High Blood Pressure Father     High Cholesterol Father     High Blood Pressure Sister     High Cholesterol Sister     Depression Sister     Diabetes Brother     Kidney Disease Brother     Other Paternal Grandfather         alzheimers    Heart Disease Maternal Grandfather      Social History     Socioeconomic History    Marital status: Single     Spouse name: Not on file    Number of children: 4    Years of education: Not on file    Highest education level: Associate degree: academic program   Occupational History    Occupation: [employer]   Tobacco Use    Smoking status: Never Smoker    Smokeless tobacco: Never Used   Substance and Sexual Activity    Alcohol use: Yes     Comment: occasionally    Drug use: No    Sexual activity: Yes     Partners: Male     Birth control/protection: OCP   Other Topics Concern    Not on file   Social History Narrative    Lives home with  and 3 babies ( daughter 15,  Son 8, 4 son). Social Determinants of Health     Financial Resource Strain: Low Risk     Difficulty of Paying Living Expenses: Not hard at all   Food Insecurity: No Food Insecurity    Worried About Running Out of Food in the Last Year: Never true    Vamshi of Food in the Last Year: Never true   Transportation Needs: No Transportation Needs    Lack of Transportation (Medical): No    Lack of Transportation (Non-Medical): No   Physical Activity: Sufficiently Active    Days of Exercise per Week: 5 days    Minutes of Exercise per Session: 60 min   Stress: Stress Concern Present    Feeling of Stress : To some extent   Social Connections: Socially Integrated    Frequency of Communication with Friends and Family: More than three times a week    Frequency of Social Gatherings with Friends and Family: More than three times a week    Attends [employer] Services: 1 to 4 times per year   [employer] [facility] [employer] or Organizations: Yes    Attends Club or Organization Meetings: 1 to 4 times per year    Marital Status:    Intimate Partner Violence: Not At Risk    Fear of Current or Ex-Partner: No    Emotionally Abused: No    Physically Abused: No    Sexually Abused: No           Review of Systems   Constitutional: Negative. Negative for diaphoresis and fatigue. HENT: Negative. Eyes: Negative. Respiratory: Negative. Cardiovascular: Negative. Gastrointestinal: Positive for abdominal pain. Endocrine: Negative. Genitourinary: Negative. Negative for difficulty urinating, dyspareunia, dysuria, enuresis, flank pain, frequency, genital sores, hematuria and menstrual problem. Musculoskeletal: Positive for gait problem and joint swelling. Skin: Negative. Allergic/Immunologic: Negative. Hematological: Negative. Psychiatric/Behavioral: Negative. Vitals:    10/19/21 1454   BP: 118/80   Pulse: 101   Temp: 97.6 °F (36.4 °C)   SpO2: 98%     . BP Readings from Last 3 Encounters:   10/19/21 118/80   02/02/21 132/76   07/30/19 126/78     Wt Readings from Last 3 Encounters:   10/19/21 182 lb (82.6 kg)   02/02/21 170 lb (77.1 kg)   07/30/19 156 lb (70.8 kg)     Past Medical History:   Diagnosis Date    Diverticulosis     Hypertension      Family History   Problem Relation Age of Onset    High Blood Pressure Mother     High Cholesterol Mother     Other Mother         thyroid problens    High Blood Pressure Father     High Cholesterol Father     High Blood Pressure Sister     High Cholesterol Sister     Depression Sister     Diabetes Brother     Kidney Disease Brother     Other Paternal Grandfather         alzheimers    Heart Disease Maternal Grandfather      Objective:   Physical Exam  Vitals and nursing note reviewed. Exam conducted with a chaperone present. Constitutional:       Appearance: She is well-developed. She is obese. HENT:      Head: Normocephalic and atraumatic. Nose: Nose normal.      Mouth/Throat:      Mouth: Mucous membranes are moist.   Eyes:      Conjunctiva/sclera: Conjunctivae normal.      Pupils: Pupils are equal, round, and reactive to light. Cardiovascular:      Rate and Rhythm: Normal rate and regular rhythm. Pulses: Normal pulses. Heart sounds: Normal heart sounds. Pulmonary:      Effort: Pulmonary effort is normal.      Breath sounds: Normal breath sounds. Musculoskeletal:      Cervical back: Normal range of motion.    Skin: General: Skin is warm. Capillary Refill: Capillary refill takes less than 2 seconds. Findings: Lesion present. No erythema or rash. Comments: Areas on bilateral feet with hyperpigmented well-circumscribed macular lesions with some central clearing, no scale no thickening of the nail   Neurological:      General: No focal deficit present. Mental Status: She is alert and oriented to person, place, and time. Mental status is at baseline. Cranial Nerves: No cranial nerve deficit. Coordination: Coordination normal.   Psychiatric:         Mood and Affect: Mood normal.         Behavior: Behavior normal.         Thought Content: Thought content normal.         Judgment: Judgment normal.         Assessment/Plan:  Homa Dyer was seen today for rash. Diagnoses and all orders for this visit:    Dermatitis-patient patient previous rash appears to be pattern of possibly scabies, she was treated with steroids and had improvement but now has a new rash on her feet which is not as severe as her previous rash we will treat as a fungal dermatitis with oral antifungal and use the steroid cream on her feet. Patient was advised to follow-up if worsening disease  -     clobetasol (TEMOVATE) 0.05 % cream; Apply topically 2 times daily. , to bilateral  feet    Acute recurrent maxillary sinusitis  -     fluticasone (FLONASE) 50 MCG/ACT nasal spray; SPRAY TWO SPRAYS IN EACH NOSTRIL ONCE DAILY    Recurrent cold sores  -     acyclovir (ZOVIRAX) 800 MG tablet; TAKE ONE TABLET BY MOUTH TWICE A DAY FOR 10 DAYS    Cold sore  -     acyclovir (ZOVIRAX) 5 % ointment; Apply topically every 3 hours. Eczema, unspecified type    Hyperhidrosis  -     aluminum chloride (DRYSOL) 20 % external solution; Apply topically nightly. Fungal dermatitis  -     terbinafine (LAMISIL) 250 MG tablet; Take 1 tablet by mouth daily for 14 days      Return in about 6 weeks (around 11/30/2021) for ove.       Jil Odom MD

## 2021-12-15 ENCOUNTER — OFFICE VISIT (OUTPATIENT)
Dept: GYNECOLOGY | Age: 41
End: 2021-12-15
Payer: COMMERCIAL

## 2021-12-15 VITALS
TEMPERATURE: 98.4 F | HEART RATE: 85 BPM | DIASTOLIC BLOOD PRESSURE: 98 MMHG | BODY MASS INDEX: 34.12 KG/M2 | SYSTOLIC BLOOD PRESSURE: 141 MMHG | HEIGHT: 62 IN | WEIGHT: 185.4 LBS

## 2021-12-15 DIAGNOSIS — N93.9 ABNORMAL UTERINE BLEEDING: ICD-10-CM

## 2021-12-15 DIAGNOSIS — N83.202 CYST OF LEFT OVARY: ICD-10-CM

## 2021-12-15 DIAGNOSIS — R33.9 URINARY RETENTION: Primary | ICD-10-CM

## 2021-12-15 LAB
BASOPHILS ABSOLUTE: 0 K/UL (ref 0–0.2)
BASOPHILS RELATIVE PERCENT: 0.4 %
BILIRUBIN, POC: ABNORMAL
BLOOD URINE, POC: ABNORMAL
CLARITY, POC: ABNORMAL
COLOR, POC: ABNORMAL
EOSINOPHILS ABSOLUTE: 0.3 K/UL (ref 0–0.6)
EOSINOPHILS RELATIVE PERCENT: 3.3 %
GLUCOSE URINE, POC: ABNORMAL
HCT VFR BLD CALC: 39 % (ref 36–48)
HEMOGLOBIN: 12.9 G/DL (ref 12–16)
KETONES, POC: ABNORMAL
LEUKOCYTE EST, POC: ABNORMAL
LYMPHOCYTES ABSOLUTE: 2.7 K/UL (ref 1–5.1)
LYMPHOCYTES RELATIVE PERCENT: 29.8 %
MCH RBC QN AUTO: 33.9 PG (ref 26–34)
MCHC RBC AUTO-ENTMCNC: 33.2 G/DL (ref 31–36)
MCV RBC AUTO: 102 FL (ref 80–100)
MONOCYTES ABSOLUTE: 0.8 K/UL (ref 0–1.3)
MONOCYTES RELATIVE PERCENT: 8.8 %
NEUTROPHILS ABSOLUTE: 5.1 K/UL (ref 1.7–7.7)
NEUTROPHILS RELATIVE PERCENT: 57.7 %
NITRITE, POC: ABNORMAL
PDW BLD-RTO: 13.9 % (ref 12.4–15.4)
PH, POC: 6.5
PLATELET # BLD: 327 K/UL (ref 135–450)
PMV BLD AUTO: 8.8 FL (ref 5–10.5)
PROTEIN, POC: ABNORMAL
RBC # BLD: 3.82 M/UL (ref 4–5.2)
SPECIFIC GRAVITY, POC: 1.03
UROBILINOGEN, POC: 1
WBC # BLD: 8.9 K/UL (ref 4–11)

## 2021-12-15 PROCEDURE — G8427 DOCREV CUR MEDS BY ELIG CLIN: HCPCS | Performed by: OBSTETRICS & GYNECOLOGY

## 2021-12-15 PROCEDURE — 81002 URINALYSIS NONAUTO W/O SCOPE: CPT | Performed by: OBSTETRICS & GYNECOLOGY

## 2021-12-15 PROCEDURE — 1036F TOBACCO NON-USER: CPT | Performed by: OBSTETRICS & GYNECOLOGY

## 2021-12-15 PROCEDURE — G8484 FLU IMMUNIZE NO ADMIN: HCPCS | Performed by: OBSTETRICS & GYNECOLOGY

## 2021-12-15 PROCEDURE — G8417 CALC BMI ABV UP PARAM F/U: HCPCS | Performed by: OBSTETRICS & GYNECOLOGY

## 2021-12-15 PROCEDURE — 99203 OFFICE O/P NEW LOW 30 MIN: CPT | Performed by: OBSTETRICS & GYNECOLOGY

## 2021-12-15 NOTE — PROGRESS NOTES
Pt is here with a 2 year h/o bilateral pelvic pain. States she has pain before and during menses and during and after intercourse. She notes pelvic bloating and urinary retention, feels like she has a UTI. Has monthly menses lasting 3-4 days, heavy at times. Currently on her period so declines exam today. Notes family h/o breast (aunt) and ovarian (aunt) cancer. Had mammogram at [facility] this year- normal.  Pelvic US 2 years ago noted small uterine fibroid. Assess:  Dysuria, pelvic pain, menorrhagia  Plan: Will check urine culture, pelvic US, and cbc. Also BRCA testing. F/u here when not on period so I can perform pelvic exam.  30 min >50% of time was spent discussing findings, management, and treatment options.

## 2021-12-16 LAB — URINE CULTURE, ROUTINE: NORMAL

## 2021-12-29 ENCOUNTER — HOSPITAL ENCOUNTER (OUTPATIENT)
Dept: ULTRASOUND IMAGING | Age: 41
Discharge: HOME | End: 2021-12-29
Payer: COMMERCIAL

## 2021-12-29 DIAGNOSIS — N83.202 CYST OF LEFT OVARY: ICD-10-CM

## 2021-12-29 PROCEDURE — 76830 TRANSVAGINAL US NON-OB: CPT

## 2021-12-29 PROCEDURE — 76856 US EXAM PELVIC COMPLETE: CPT

## 2022-01-09 NOTE — PROGRESS NOTES
ENCOUNTER DATE: 1/10/2022     NAME: Baldomero Gonzalez   AGE: 42 y.o. GENDER: female   YOB: 1980    Patient Active Problem List   Diagnosis    Eczema    Cyst of left ovary    H/O incompetent cervix, currently pregnant, second trimester    H/O  delivery, currently pregnant, second trimester    History of cervical cerclage, currently pregnant in second trimester    History of classical  section      Allergies   Allergen Reactions    Latex Hives     Current Outpatient Medications on File Prior to Visit   Medication Sig Dispense Refill    fluticasone (FLONASE) 50 MCG/ACT nasal spray SPRAY TWO SPRAYS IN EACH NOSTRIL ONCE DAILY 16 g 0    acyclovir (ZOVIRAX) 800 MG tablet TAKE ONE TABLET BY MOUTH TWICE A DAY FOR 10 DAYS 20 tablet 0    acyclovir (ZOVIRAX) 5 % ointment Apply topically every 3 hours. 30 g 1    aluminum chloride (DRYSOL) 20 % external solution Apply topically nightly. 60 mL 5    clobetasol (TEMOVATE) 0.05 % cream Apply topically 2 times daily. , to bilateral  feet 60 g 1    amLODIPine (NORVASC) 5 MG tablet TAKE ONE TABLET BY MOUTH DAILY (Patient not taking: Reported on 10/19/2021) 30 tablet 5    triamcinolone (KENALOG) 0.1 % ointment APPLY TO AFFECTED AREA(S) TWO TIMES A DAY FOR ECZEMA 80 g 2     No current facility-administered medications on file prior to visit.         Past Medical History:   Diagnosis Date    Diverticulosis     Hypertension      Past Surgical History:   Procedure Laterality Date     SECTION      OTHER SURGICAL HISTORY      had mirena placed which ruptured from the uterus and had surgery to remove through belly botton      Family History   Problem Relation Age of Onset    High Blood Pressure Mother     High Cholesterol Mother     Other Mother         thyroid problens    High Blood Pressure Father     High Cholesterol Father     High Blood Pressure Sister     High Cholesterol Sister     Depression Sister     Diabetes Brother  Kidney Disease Brother     Other Paternal Grandfather         alzheimers    Heart Disease Maternal Grandfather      Social History     Tobacco Use    Smoking status: Never Smoker    Smokeless tobacco: Never Used   Substance Use Topics    Alcohol use: Yes     Comment: occasionally      Patient Care Team:  Ronald Gordillo MD as PCP - General (Pediatrics)  Ronald [facility]ordillo [facility] PCP [facility]    No chief complaint on file. HPI:  Yasmany Nieves is here as a new patient to establish care    GYN:  Recent apt  Had recent US  Small fibroid noted on US    RAYNAUDS:  On norvasc 5mg    HYPERTENSION:  BP recently elevated        ROS:  Review of Systems       VITALS:  LMP 12/13/2021      PE:  Physical Exam       Lab Results   Component Value Date    CHOL 199 02/02/2021    CHOL 184 02/25/2014     Lab Results   Component Value Date    TRIG 63 02/02/2021    TRIG 69 02/25/2014     Lab Results   Component Value Date    HDL 67 (H) 02/02/2021    HDL 64 (H) 02/25/2014     Lab Results   Component Value Date    LDLCALC 119 (H) 02/02/2021    LDLCALC 106 (H) 02/25/2014     Lab Results   Component Value Date    LABVLDL 13 02/02/2021    LABVLDL 14 02/25/2014     No results found for: [facility]  Lab Results   Component Value Date     03/02/2018    K 4.5 03/02/2018     03/02/2018    CO2 22 03/02/2018    BUN 7 03/02/2018    CREATININE <0.5 (L) 03/02/2018    GLUCOSE 136 (H) 03/02/2018    CALCIUM 8.6 03/02/2018    PROT 7.7 03/02/2018    LABALBU 4.2 03/02/2018    BILITOT 0.7 03/02/2018    ALKPHOS 77 03/02/2018    AST 18 03/02/2018    ALT 11 03/02/2018    LABGLOM >60 03/02/2018    GFRAA >60 03/02/2018    AGRATIO 1.2 03/02/2018    GLOB 3.5 03/02/2018           ASSESSMENT/PLAN:  There are no diagnoses linked to this encounter. No follow-ups on file.      Electronically signed by GEMA Parada CNP on 1/10/2022 at 12:38 PM

## 2022-01-10 ENCOUNTER — OFFICE VISIT (OUTPATIENT)
Dept: PRIMARY CARE CLINIC | Age: 42
End: 2022-01-10
Payer: COMMERCIAL

## 2022-01-10 ENCOUNTER — OFFICE VISIT (OUTPATIENT)
Dept: GYNECOLOGY | Age: 42
End: 2022-01-10
Payer: COMMERCIAL

## 2022-01-10 VITALS
SYSTOLIC BLOOD PRESSURE: 145 MMHG | HEART RATE: 75 BPM | BODY MASS INDEX: 32.92 KG/M2 | WEIGHT: 180 LBS | TEMPERATURE: 97.6 F | DIASTOLIC BLOOD PRESSURE: 97 MMHG

## 2022-01-10 VITALS
SYSTOLIC BLOOD PRESSURE: 132 MMHG | HEIGHT: 62 IN | BODY MASS INDEX: 33.23 KG/M2 | HEART RATE: 85 BPM | OXYGEN SATURATION: 98 % | WEIGHT: 180.6 LBS | DIASTOLIC BLOOD PRESSURE: 90 MMHG | TEMPERATURE: 98.7 F

## 2022-01-10 DIAGNOSIS — J01.90 ACUTE BACTERIAL SINUSITIS: ICD-10-CM

## 2022-01-10 DIAGNOSIS — A60.00 GENITAL HERPES SIMPLEX, UNSPECIFIED SITE: ICD-10-CM

## 2022-01-10 DIAGNOSIS — Z86.79 H/O RAYNAUD'S SYNDROME: ICD-10-CM

## 2022-01-10 DIAGNOSIS — B96.89 ACUTE BACTERIAL SINUSITIS: ICD-10-CM

## 2022-01-10 DIAGNOSIS — Z01.419 WELL WOMAN EXAM WITH ROUTINE GYNECOLOGICAL EXAM: Primary | ICD-10-CM

## 2022-01-10 DIAGNOSIS — L30.9 ECZEMA, UNSPECIFIED TYPE: ICD-10-CM

## 2022-01-10 DIAGNOSIS — R03.0 ELEVATED BP WITHOUT DIAGNOSIS OF HYPERTENSION: ICD-10-CM

## 2022-01-10 DIAGNOSIS — R09.81 NASAL CONGESTION: Primary | ICD-10-CM

## 2022-01-10 PROCEDURE — 1036F TOBACCO NON-USER: CPT | Performed by: NURSE PRACTITIONER

## 2022-01-10 PROCEDURE — G8417 CALC BMI ABV UP PARAM F/U: HCPCS | Performed by: NURSE PRACTITIONER

## 2022-01-10 PROCEDURE — G8484 FLU IMMUNIZE NO ADMIN: HCPCS | Performed by: OBSTETRICS & GYNECOLOGY

## 2022-01-10 PROCEDURE — 99214 OFFICE O/P EST MOD 30 MIN: CPT | Performed by: NURSE PRACTITIONER

## 2022-01-10 PROCEDURE — 99396 PREV VISIT EST AGE 40-64: CPT | Performed by: OBSTETRICS & GYNECOLOGY

## 2022-01-10 PROCEDURE — G8484 FLU IMMUNIZE NO ADMIN: HCPCS | Performed by: NURSE PRACTITIONER

## 2022-01-10 PROCEDURE — G8427 DOCREV CUR MEDS BY ELIG CLIN: HCPCS | Performed by: NURSE PRACTITIONER

## 2022-01-10 RX ORDER — ACYCLOVIR 800 MG/1
TABLET ORAL
Qty: 20 TABLET | Refills: 0 | Status: SHIPPED | OUTPATIENT
Start: 2022-01-10 | End: 2022-08-22

## 2022-01-10 RX ORDER — OLOPATADINE HYDROCHLORIDE 1 MG/ML
1 SOLUTION/ DROPS OPHTHALMIC 2 TIMES DAILY
COMMUNITY
End: 2022-03-07 | Stop reason: CLARIF

## 2022-01-10 RX ORDER — BENZONATATE 200 MG/1
200 CAPSULE ORAL 3 TIMES DAILY PRN
Qty: 30 CAPSULE | Refills: 0 | Status: SHIPPED | OUTPATIENT
Start: 2022-01-10 | End: 2022-01-20

## 2022-01-10 RX ORDER — AZITHROMYCIN 250 MG/1
TABLET, FILM COATED ORAL
Qty: 1 PACKET | Refills: 0 | Status: SHIPPED | OUTPATIENT
Start: 2022-01-10 | End: 2022-03-07 | Stop reason: ALTCHOICE

## 2022-01-10 ASSESSMENT — ENCOUNTER SYMPTOMS
VOMITING: 0
RHINORRHEA: 1
DIARRHEA: 0
COUGH: 1
SINUS PAIN: 1
CHEST TIGHTNESS: 0
ABDOMINAL PAIN: 0
SHORTNESS OF BREATH: 0
WHEEZING: 0
SORE THROAT: 1
COLOR CHANGE: 0
NAUSEA: 0
SINUS PRESSURE: 1
TROUBLE SWALLOWING: 0

## 2022-01-10 ASSESSMENT — PATIENT HEALTH QUESTIONNAIRE - PHQ9
SUM OF ALL RESPONSES TO PHQ9 QUESTIONS 1 & 2: 0
SUM OF ALL RESPONSES TO PHQ QUESTIONS 1-9: 0
SUM OF ALL RESPONSES TO PHQ QUESTIONS 1-9: 0
1. LITTLE INTEREST OR PLEASURE IN DOING THINGS: 0
SUM OF ALL RESPONSES TO PHQ QUESTIONS 1-9: 0
2. FEELING DOWN, DEPRESSED OR HOPELESS: 0
SUM OF ALL RESPONSES TO PHQ QUESTIONS 1-9: 0

## 2022-01-10 NOTE — PROGRESS NOTES
Subjective:      Patient ID: Lucia Dickerson is a 42 y.o. female. HPI  Pt presents for annual gyn exam.  She has regular monthly menses but they are very heavy and have cramps. Hb 12.  US showed elongated uterus with fibroid. H/o 4 c/s. Mammogram ordered. Review of Systems Pertinent review of systems items discussed above. All others systems items not discussed above were negative. Objective:   Physical Exam  Constitutional:       Appearance: She is well-developed. HENT:      Head: Normocephalic and atraumatic. Neck:      Thyroid: No thyromegaly. Trachea: No tracheal deviation. Cardiovascular:      Rate and Rhythm: Normal rate and regular rhythm. Heart sounds: Normal heart sounds. No murmur heard. Pulmonary:      Effort: Pulmonary effort is normal. No respiratory distress. Breath sounds: Normal breath sounds. No wheezing or rales. Chest:   Breasts:      Right: No mass, nipple discharge or skin change. Left: No mass, nipple discharge or skin change. Abdominal:      General: There is no distension. Palpations: Abdomen is soft. There is no mass. Tenderness: There is no abdominal tenderness. There is no rebound. Genitourinary:     Labia:         Right: No lesion. Left: No lesion. Vagina: Normal. No foreign body. No vaginal discharge. Cervix: No cervical motion tenderness, discharge or friability. Uterus: Not deviated, not enlarged, not fixed and not tender. Adnexa:         Right: No mass or tenderness. Left: No mass or tenderness. Rectum: Normal. No external hemorrhoid. Comments: Pap performed. Musculoskeletal:         General: Normal range of motion. Lymphadenopathy:      Cervical: No cervical adenopathy. Neurological:      Mental Status: She is alert and oriented to person, place, and time. Assessment:   Normal gyn exam, menorrhagia, dysmenorrhea     Plan:   rx lysteda.   Call with results. I discussed findings with pt. If the medicine doesn't work for her she may benefit from a hysterectomy bc her uterus feels attached to the ant abd wall.        Juli Kimbrough MD

## 2022-01-10 NOTE — PROGRESS NOTES
1/10/2022        TELEHEALTH EVALUATION -- Audio/Visual (During YGYUP-26 public health emergency)    HPI:    Randall Dominguez (:  1980) has requested an audio/video evaluation for the following concern(s):    Patient seen virtually as a new patient to establish care. Is being seen virtually due to her being ill. Previous PCP Dr Lindsey Weir. GYN:  Follows with GYN  Recent apt. Had recent US  Small fibroid noted on US    RAYNAUDS:  Previously on norvasc 5mg. Didn't like the way it made her feel. HYPERTENSION:  BP recently elevated. Hasn't been on BP meds for about 5 years. Was previously on norvasc for raynauds  Home readings are normal.   Feels her BP is up today since she doesn't feel well. ECZEMA:  Uses clobetasol prn. Feet started peeling on the top and this helped. HERPES:  H/o genital   Takes acyclovir PO and topical only prn for flare ups. SICK:  Has nasal congestion and drainage x 4 days  Scratchy throat. Now has cough  Takes zyrtec and flonase daily. Started on mucinex  Nasal congestion is worse. Sinus pressure worse. Now having bloody drainage and enlarged LN, which has resolved. Has h/o sinus infections. Signs and symptoms feel similar. Cough is productive with yellow sputum. No shortness of breath or wheezing. Had covid test last wed. 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 Negative for activity change, appetite change, chills and fever. HENT: Positive for congestion, postnasal drip, rhinorrhea, sinus pressure, sinus pain and sore throat. Negative for ear pain and trouble swallowing. Respiratory: Positive for cough. Negative for chest tightness, shortness of breath and wheezing. Cardiovascular: Negative for chest pain, palpitations and leg swelling. Gastrointestinal: Negative for abdominal pain, diarrhea, nausea and vomiting.    Genitourinary: Negative for difficulty urinating. Musculoskeletal: Negative for myalgias. Skin: Negative for color change, pallor and rash. Neurological: Positive for headaches. Negative for dizziness, weakness and light-headedness. Hematological: Positive for adenopathy. Prior to Visit Medications    Medication Sig Taking? Authorizing Provider   olopatadine (PATANOL) 0.1 % ophthalmic solution 1 drop 2 times daily Yes Historical Provider, MD   azithromycin (ZITHROMAX) 250 MG tablet Take 2 tabs PO (500mg) on day 1, then 1 tab daily days 2-5 Yes GEMA Blankenship CNP   benzonatate (TESSALON) 200 MG capsule Take 1 capsule by mouth 3 times daily as needed for Cough Yes GEMA Blankenship CNP   acyclovir (ZOVIRAX) 800 MG tablet TAKE ONE TABLET BY MOUTH TWICE A DAY FOR 10 DAYS Yes GEMA Blankenship CNP   fluticasone (FLONASE) 50 MCG/ACT nasal spray SPRAY TWO SPRAYS IN EACH NOSTRIL ONCE DAILY Yes Sebastian Meyer MD   acyclovir (ZOVIRAX) 5 % ointment Apply topically every 3 hours. Yes Sebastian Meyer MD   aluminum chloride (DRYSOL) 20 % external solution Apply topically nightly. Yes Sebastian Meyer MD   clobetasol (TEMOVATE) 0.05 % cream Apply topically 2 times daily. , to bilateral  feet Yes Sebastian Meyer MD   triamcinolone (KENALOG) 0.1 % ointment APPLY TO AFFECTED AREA(S) TWO TIMES A DAY FOR ECZEMA Yes Sebastian Meyer MD       Social History     Tobacco Use    Smoking status: Never Smoker    Smokeless tobacco: Never Used   Vaping Use    Vaping Use: Never used   Substance Use Topics    Alcohol use: Yes     Comment: occasionally    Drug use: No        Allergies   Allergen Reactions    Latex Hives   ,   Past Medical History:   Diagnosis Date    Diverticulitis     Diverticulosis     Hypertension    ,   Past Surgical History:   Procedure Laterality Date     SECTION      OTHER SURGICAL HISTORY      had mirena placed which ruptured from the uterus and had surgery to remove through bellmaren cohen       PHYSICAL EXAMINATION:  [ INSTRUCTIONS:  \"[x]\" Indicates a positive item  \"[]\" Indicates a negative item  -- DELETE ALL ITEMS NOT EXAMINED]  Vital Signs: (As obtained by patient/caregiver or practitioner observation)    Blood pressure-  Heart rate-    Respiratory rate-    Temperature-  Pulse oximetry-     Constitutional: [x] Appears well-developed and well-nourished [x] No apparent distress      [] Abnormal-   Mental status  [x] Alert and awake  [x] Oriented to person/place/time [x]Able to follow commands      Eyes:  EOM    [x]  Normal  [] Abnormal-  Sclera  [x]  Normal  [] Abnormal -         Discharge [x]  None visible  [] Abnormal -    HENT:   [x] Normocephalic, atraumatic. [] Abnormal CONGESTION NOTED  [x] Mouth/Throat: Mucous membranes are moist.     External Ears [x] Normal  [] Abnormal-     Neck: [x] No visualized mass     Pulmonary/Chest: [x] Respiratory effort normal.  [x] No visualized signs of difficulty breathing or respiratory distress        [] Abnormal- FREQUENT COUGH NOTED     Musculoskeletal:   [] Normal gait with no signs of ataxia         [x] Normal range of motion of neck        [] Abnormal-       Neurological:        [x] No Facial Asymmetry (Cranial nerve 7 motor function) (limited exam to video visit)          [x] No gaze palsy        [] Abnormal-         Skin:        [x] No significant exanthematous lesions or discoloration noted on facial skin         [] Abnormal-            Psychiatric:       [x] Normal Affect [x] No Hallucinations        [] Abnormal-     Other pertinent observable physical exam findings-     ASSESSMENT/PLAN:  1. Nasal congestion  Check covid test today. Proceed pending results. Continue with symptomatic control.   - COVID-19; Future  - COVID-19    2. Acute bacterial sinusitis  May take muccinex otc for congestion/mucous. May take otc antihistamine (zyrtec/claritin) as needed for drainage.    May use nasal saline spray or sinus rinses/netti pot for nasal congestion. May alternate Tylenol/Motrin as needed for fever/aches. Steam showers. Increase fluids. Rest.   Start on anbx and cough med as discussed. - azithromycin (ZITHROMAX) 250 MG tablet; Take 2 tabs PO (500mg) on day 1, then 1 tab daily days 2-5  Dispense: 1 packet; Refill: 0  - benzonatate (TESSALON) 200 MG capsule; Take 1 capsule by mouth 3 times daily as needed for Cough  Dispense: 30 capsule; Refill: 0    3. Elevated BP without diagnosis of hypertension  Will schedule physical in 2 wks to re evaluate when she is not sick  Will continue to check her BP at home and keep log  May need to start low dose BP     4. H/O Raynaud's syndrome  No longer taking norvasc. Didn't tolerate. 5. Genital herpes simplex, unspecified site  Takes PO and topical only PRN for flare ups  No refill needed on topical.   - acyclovir (ZOVIRAX) 800 MG tablet; TAKE ONE TABLET BY MOUTH TWICE A DAY FOR 10 DAYS  Dispense: 20 tablet; Refill: 0    6. Eczema, unspecified type  Stable. Has rx at home. No refill needed. Return in about 2 weeks (around 1/24/2022) for Physical and BP follow up. July Hernandez, was evaluated through a synchronous (real-time) audio-video encounter. The patient (or guardian if applicable) is aware that this is a billable service. Verbal consent to proceed has been obtained within the past 12 months. The visit was conducted pursuant to the emergency declaration under the [address] [place] [address], [address] [place] [address] authority and the [employer] General Act. Patient identification was verified, and a caregiver was present when appropriate. The patient was located in a state where the provider was credentialed to provide care. Total time spent on this encounter: Not billed by time    --GEMA Gregorio CNP on 1/10/2022 at 10:49 AM    An electronic signature was used to authenticate this note.

## 2022-01-11 LAB — SARS-COV-2: DETECTED

## 2022-02-01 ENCOUNTER — PATIENT MESSAGE (OUTPATIENT)
Dept: GYNECOLOGY | Age: 42
End: 2022-02-01

## 2022-02-01 NOTE — TELEPHONE ENCOUNTER
From: Pepe Massey  To: Dr. Brennen Reyes: 1/3/9392 11:57 AM EST  Subject: Last 2 visits    Hello, I came to my last visit and Dr. Darrick Alba said he was going call in a prescription of 1 or 2 things for my menstual cramps. I went to the pharmacy and nothing called in. I am about to start my period again and I am cramping really bad. Also I did the cancer screening test my first visit and I wanted to know have my test results came back yet? Thank you.

## 2022-02-02 RX ORDER — TRANEXAMIC ACID 650 1/1
1300 TABLET ORAL 3 TIMES DAILY
Qty: 30 TABLET | Refills: 5 | Status: SHIPPED | OUTPATIENT
Start: 2022-02-02

## 2022-02-07 ENCOUNTER — TELEPHONE (OUTPATIENT)
Dept: INTERNAL MEDICINE CLINIC | Age: 42
End: 2022-02-07

## 2022-02-07 NOTE — TELEPHONE ENCOUNTER
pt calling to get a call back from pcp nurse due   to her blowing her nose and having bright red blood when she blows, it   went away but it is now coming back, she is also coughing up blood in the   mucus she coughs up. she was positive for covid roughly two weeks ago and   she would just like a call back.      Patient was last seen 10/19/2021  No future appts

## 2022-02-07 NOTE — TELEPHONE ENCOUNTER
----- Message from Trisha Garnett sent at 2/4/2022  1:35 PM EST -----  Subject: Message to Provider    QUESTIONS  Information for Provider? pt calling to get a call back from pcp nurse due   to her blowing her nose and having bright red blood when she blows, it   went away but it is now coming back, she is also coughing up blood in the   mucus she coughs up. she was positive for covid roughly two weeks ago and   she would just like a call back. ---------------------------------------------------------------------------  --------------  Nuria RINCON  What is the best way for the office to contact you? OK to leave message on   voicemail  Preferred Call Back Phone Number? [phone]  ---------------------------------------------------------------------------  --------------  SCRIPT ANSWERS  Relationship to Patient?  Self

## 2022-02-11 ENCOUNTER — NURSE TRIAGE (OUTPATIENT)
Dept: OTHER | Facility: CLINIC | Age: 42
End: 2022-02-11

## 2022-02-11 NOTE — TELEPHONE ENCOUNTER
Received call from [facility] at [facility] with [employer]. Subjective: Caller states \"Started yesterday, got up and took kids to school. Started having pain in lower back, progressed to not being able to stand. Pain started going down right leg with numbness and tingling\"     Current Symptoms: Lower back pain radiating down right leg, right leg numbness/tingling     Onset: Yesterday     Associated Symptoms: reduced activity, NA    Pain Severity: 9/10; sharp, aching; constant    Temperature: Denies fever and feeling feverish/chills    What has been tried: Ibuprofen, epson salt, stretch, ice, heat     LMP: was not asked Pregnant: Unknown    Recommended disposition: See in office today. Advised caller if unable to get an appointment in the suggested time frame to go to an [facility] or walk-in clinic, caller agreeable. Care advice provided, patient verbalizes understanding; denies any other questions or concerns; instructed to call back for any new or worsening symptoms. Writer provided warm transfer to Haris Ward at [facility] for appointment scheduling     Attention Provider: Thank you for allowing me to participate in the care of your patient. The patient was connected to triage in response to information provided to the ECC/PSC. Please do not respond through this encounter as the response is not directed to a shared pool.     Reason for Disposition   SEVERE back pain (e.g., excruciating, unable to do any normal activities) and not improved after pain medicine and CARE ADVICE    Protocols used: BACK PAIN-ADULT-[place]

## 2022-03-03 NOTE — PROGRESS NOTES
ENCOUNTER DATE: 3/7/2022     NAME: Chetan Condon   AGE: 36 y.o. GENDER: female   YOB: 1980    Patient Active Problem List   Diagnosis    Eczema    Cyst of left ovary    History of classical  section    Genital herpes simplex    Chronic constipation    History of diverticulitis    Hypertension    Raynaud's phenomenon without gangrene      Allergies   Allergen Reactions    Latex Hives     Current Outpatient Medications on File Prior to Visit   Medication Sig Dispense Refill    tranexamic acid (LYSTEDA) 650 MG TABS tablet Take 2 tablets by mouth 3 times daily 30 tablet 5    acyclovir (ZOVIRAX) 800 MG tablet TAKE ONE TABLET BY MOUTH TWICE A DAY FOR 10 DAYS 20 tablet 0    fluticasone (FLONASE) 50 MCG/ACT nasal spray SPRAY TWO SPRAYS IN EACH NOSTRIL ONCE DAILY 16 g 0    acyclovir (ZOVIRAX) 5 % ointment Apply topically every 3 hours. 30 g 1    aluminum chloride (DRYSOL) 20 % external solution Apply topically nightly. 60 mL 5    clobetasol (TEMOVATE) 0.05 % cream Apply topically 2 times daily. , to bilateral  feet 60 g 1    triamcinolone (KENALOG) 0.1 % ointment APPLY TO AFFECTED AREA(S) TWO TIMES A DAY FOR ECZEMA 80 g 2     No current facility-administered medications on file prior to visit.         Past Medical History:   Diagnosis Date    Diverticulitis     Diverticulosis     H/O Raynaud's syndrome 1/10/2022    Hypertension      Past Surgical History:   Procedure Laterality Date     SECTION      OTHER SURGICAL HISTORY      had mirena placed which ruptured from the uterus and had surgery to remove through belly botton      Family History   Problem Relation Age of Onset    High Blood Pressure Mother     High Cholesterol Mother     Other Mother         thyroid problens    High Blood Pressure Father     High Cholesterol Father     High Blood Pressure Sister     High Cholesterol Sister     Depression Sister     Diabetes Brother     Kidney Disease Brother     Other Paternal Grandfather         alzheimers    Heart Disease Maternal Grandfather      Social History     Tobacco Use    Smoking status: Never Smoker    Smokeless tobacco: Never Used   Substance Use Topics    Alcohol use: Yes     Comment: occasionally      Patient Care Team:  GEMA Puga CNP as PCP - General (Family Nurse Practitioner)  GEMA Puga CNP as PCP - [facility] Empaneled Provider    Chief Complaint   Patient presents with    Annual Exam        HPI:  Esperanza Morejon is here for a physical  Is . Has children in the home, including foster child (8y/o), which she is in the process of adopting. GYN:  Follows with GYN  Recent apt. Pap completed. H/o endometriosis, fibroid and cysts. FH: +ovarian cancer, breast cancer    HYPERTENSION:  Previously on norvasc for raynauds, didn't like the way it made her feel  Was on lisinopril in the past.   Has been having some chest pains. Will go away after deep breathing. May be related to anxiety, stress. +FH CAD (mother, aunt)    CONSTIPATION:  Chronic. Takes metamucil, fiber supplement on a regular basis. Takes stool softeners prn. Took laxative, ended up in ED with diverticulitis. H/o diverticulitis. Never had cscope. Been told she needed cscope in the past.     RAYNAUDS:  Feet will get purple/blue at certain times. Will get tingling in legs and restless. Will apply heat or weighted blankets and this helps. Tried norvasc for about 2 wks in the past, not sure why she stopped. ECZEMA:  Uses clobetasol prn. HERPES:  H/o genital   Takes acyclovir PO and topical only prn for flare ups. ROS:  Review of Systems   Constitutional: Negative for activity change, appetite change, chills, fatigue and unexpected weight change. HENT: Negative for congestion, ear pain, hearing loss, nosebleeds, postnasal drip, sneezing and sore throat.     Respiratory: Negative for cough, chest tightness, shortness of breath and wheezing. Cardiovascular: Positive for chest pain. Negative for palpitations and leg swelling. Gastrointestinal: Positive for constipation (chronic). Negative for abdominal pain, blood in stool, diarrhea, nausea and vomiting. Genitourinary: Positive for menstrual problem (painful). Negative for difficulty urinating and dysuria. Musculoskeletal: Negative for arthralgias, back pain and neck pain. Skin: Negative for color change, pallor and rash. Neurological: Negative for dizziness, tremors, numbness and headaches. Psychiatric/Behavioral: Negative for agitation, dysphoric mood and sleep disturbance. The patient is nervous/anxious (increased stress). VITALS:  BP (!) 142/98   Pulse 78   Temp 98.1 °F (36.7 °C)   Ht 5' 2\" (1.575 m)   Wt 181 lb 12.8 oz (82.5 kg)   LMP 03/01/2022   SpO2 97%   BMI 33.25 kg/m²    BP Readings from Last 3 Encounters:   03/07/22 (!) 142/98   01/10/22 (!) 145/97   01/10/22 (!) 132/90     PE:  Physical Exam  Vitals and nursing note reviewed. Constitutional:       General: She is not in acute distress. Appearance: Normal appearance. She is well-developed and normal weight. She is not diaphoretic. HENT:      Head: Normocephalic and atraumatic. Neck:      Thyroid: No thyromegaly. Vascular: No carotid bruit or JVD. Trachea: No tracheal deviation. Cardiovascular:      Rate and Rhythm: Normal rate and regular rhythm. Heart sounds: Normal heart sounds. No murmur heard. No friction rub. Pulmonary:      Effort: Pulmonary effort is normal. No respiratory distress. Breath sounds: Normal breath sounds. No stridor. No wheezing, rhonchi or rales. Abdominal:      General: Abdomen is flat. Bowel sounds are normal. There is no distension. Palpations: Abdomen is soft. There is no mass. Tenderness: There is abdominal tenderness (mild, generalized). There is no guarding or rebound. Hernia: No hernia is present.    Musculoskeletal: General: No deformity. Normal range of motion. Cervical back: Normal range of motion and neck supple. Right lower leg: No edema. Left lower leg: No edema. Lymphadenopathy:      Cervical: No cervical adenopathy. Skin:     General: Skin is warm and dry. Coloration: Skin is not pale. Findings: No erythema or rash. Neurological:      General: No focal deficit present. Mental Status: She is alert and oriented to person, place, and time. Motor: No weakness. Gait: Gait normal.   Psychiatric:         Mood and Affect: Mood normal.         Behavior: Behavior normal.         Thought Content: Thought content normal.         Judgment: Judgment normal.          Lab Results   Component Value Date    WBC 8.9 12/15/2021    HGB 12.9 12/15/2021    HCT 39.0 12/15/2021    .0 (H) 12/15/2021     12/15/2021     Lab Results   Component Value Date     03/02/2018    K 4.5 03/02/2018     03/02/2018    CO2 22 03/02/2018    BUN 7 03/02/2018    CREATININE <0.5 (L) 03/02/2018    GLUCOSE 136 (H) 03/02/2018    CALCIUM 8.6 03/02/2018    PROT 7.7 03/02/2018    LABALBU 4.2 03/02/2018    BILITOT 0.7 03/02/2018    ALKPHOS 77 03/02/2018    AST 18 03/02/2018    ALT 11 03/02/2018    LABGLOM >60 03/02/2018    GFRAA >60 03/02/2018    AGRATIO 1.2 03/02/2018    GLOB 3.5 03/02/2018       Lab Results   Component Value Date    CHOL 199 02/02/2021    CHOL 184 02/25/2014     Lab Results   Component Value Date    TRIG 63 02/02/2021    TRIG 69 02/25/2014     Lab Results   Component Value Date    HDL 67 (H) 02/02/2021    HDL 64 (H) 02/25/2014     Lab Results   Component Value Date    LDLCALC 119 (H) 02/02/2021    LDLCALC 106 (H) 02/25/2014     Lab Results   Component Value Date    LABVLDL 13 02/02/2021    LABVLDL 14 02/25/2014     No results found for: CHOLHDLRATIO    EKG: normal EKG, normal sinus rhythm. ASSESSMENT/PLAN:  1. Annual physical exam  Check fasting labs today.  Proceed pending results. Pap utd per GYN. mammo due. Will call to schedule  Discussed colon cancer screening. Referral placed. Will have form faxed for agency to confirm physical completed. - CBC; Future  - Comprehensive Metabolic Panel; Future  - Lipid Panel; Future  - TSH; Future  - Merrill Higgins MD (Colonoscopy), [facility]  - [facility] DIGITAL SCREEN W OR WO CAD BILATERAL; Future    2. Encounter for screening mammogram for malignant neoplasm of breast  - [facility] DIGITAL SCREEN W OR WO CAD BILATERAL; Future    3. Hypertension, unspecified type  Discussed in detail. Recommend starting low dose medication. Will try CCB due to raynauds. Check BP at home, keep log  Recheck in 2-3wks. - CBC; Future  - Comprehensive Metabolic Panel; Future  - Lipid Panel; Future  - TSH; Future  - amLODIPine (NORVASC) 5 MG tablet; Take 1 tablet by mouth daily  Dispense: 30 tablet; Refill: 1    4. Chest pain, unspecified type  - EKG 12 Lead    5. Chronic constipation  Refer for cscope per patient request  Continue with fiber supplements daily  Doesn't tolerate laxatives or stool softeners.   - TSH; Future  - Merrill Higgins MD (Colonoscopy), [facility]    6. History of diverticulitis  - Merrill Higgins MD (Colonoscopy), [facility]    7. Raynaud's phenomenon without gangrene  Trial of CCB   Handouts given. - amLODIPine (NORVASC) 5 MG tablet; Take 1 tablet by mouth daily  Dispense: 30 tablet; Refill: 1    8. Genital herpes simplex, unspecified site  Takes acyclovir prn   No rf needed. Return in about 2 weeks (around 3/21/2022) for blood pressure.      Electronically signed by GEMA Pérez CNP on 3/7/2022 at 9:50 AM

## 2022-03-07 ENCOUNTER — OFFICE VISIT (OUTPATIENT)
Dept: PRIMARY CARE CLINIC | Age: 42
End: 2022-03-07
Payer: COMMERCIAL

## 2022-03-07 VITALS
DIASTOLIC BLOOD PRESSURE: 98 MMHG | TEMPERATURE: 98.1 F | HEIGHT: 62 IN | WEIGHT: 181.8 LBS | BODY MASS INDEX: 33.45 KG/M2 | OXYGEN SATURATION: 97 % | HEART RATE: 78 BPM | SYSTOLIC BLOOD PRESSURE: 142 MMHG

## 2022-03-07 DIAGNOSIS — K59.09 CHRONIC CONSTIPATION: ICD-10-CM

## 2022-03-07 DIAGNOSIS — Z00.00 ANNUAL PHYSICAL EXAM: ICD-10-CM

## 2022-03-07 DIAGNOSIS — R07.9 CHEST PAIN, UNSPECIFIED TYPE: ICD-10-CM

## 2022-03-07 DIAGNOSIS — Z00.00 ANNUAL PHYSICAL EXAM: Primary | ICD-10-CM

## 2022-03-07 DIAGNOSIS — Z12.31 ENCOUNTER FOR SCREENING MAMMOGRAM FOR MALIGNANT NEOPLASM OF BREAST: ICD-10-CM

## 2022-03-07 DIAGNOSIS — Z87.19 HISTORY OF DIVERTICULITIS: ICD-10-CM

## 2022-03-07 DIAGNOSIS — I73.00 RAYNAUD'S PHENOMENON WITHOUT GANGRENE: ICD-10-CM

## 2022-03-07 DIAGNOSIS — I10 HYPERTENSION, UNSPECIFIED TYPE: ICD-10-CM

## 2022-03-07 DIAGNOSIS — A60.00 GENITAL HERPES SIMPLEX, UNSPECIFIED SITE: ICD-10-CM

## 2022-03-07 PROBLEM — Z86.79 H/O RAYNAUD'S SYNDROME: Status: RESOLVED | Noted: 2022-01-10 | Resolved: 2022-03-07

## 2022-03-07 LAB
A/G RATIO: 1.6 (ref 1.1–2.2)
ALBUMIN SERPL-MCNC: 4.3 G/DL (ref 3.4–5)
ALP BLD-CCNC: 90 U/L (ref 40–129)
ALT SERPL-CCNC: 13 U/L (ref 10–40)
ANION GAP SERPL CALCULATED.3IONS-SCNC: 12 MMOL/L (ref 3–16)
AST SERPL-CCNC: 17 U/L (ref 15–37)
BILIRUB SERPL-MCNC: 0.5 MG/DL (ref 0–1)
BUN BLDV-MCNC: 8 MG/DL (ref 7–20)
CALCIUM SERPL-MCNC: 9.1 MG/DL (ref 8.3–10.6)
CHLORIDE BLD-SCNC: 104 MMOL/L (ref 99–110)
CHOLESTEROL, TOTAL: 195 MG/DL (ref 0–199)
CO2: 24 MMOL/L (ref 21–32)
CREAT SERPL-MCNC: 0.6 MG/DL (ref 0.6–1.1)
GFR AFRICAN AMERICAN: >60
GFR NON-AFRICAN AMERICAN: >60
GLUCOSE BLD-MCNC: 84 MG/DL (ref 70–99)
HCT VFR BLD CALC: 41.4 % (ref 36–48)
HDLC SERPL-MCNC: 55 MG/DL (ref 40–60)
HEMOGLOBIN: 14 G/DL (ref 12–16)
LDL CHOLESTEROL CALCULATED: 126 MG/DL
MCH RBC QN AUTO: 34.4 PG (ref 26–34)
MCHC RBC AUTO-ENTMCNC: 33.8 G/DL (ref 31–36)
MCV RBC AUTO: 101.7 FL (ref 80–100)
PDW BLD-RTO: 14 % (ref 12.4–15.4)
PLATELET # BLD: 289 K/UL (ref 135–450)
PMV BLD AUTO: 8.6 FL (ref 5–10.5)
POTASSIUM SERPL-SCNC: 4.1 MMOL/L (ref 3.5–5.1)
RBC # BLD: 4.07 M/UL (ref 4–5.2)
SODIUM BLD-SCNC: 140 MMOL/L (ref 136–145)
TOTAL PROTEIN: 7 G/DL (ref 6.4–8.2)
TRIGL SERPL-MCNC: 72 MG/DL (ref 0–150)
TSH SERPL DL<=0.05 MIU/L-ACNC: 0.58 UIU/ML (ref 0.27–4.2)
VLDLC SERPL CALC-MCNC: 14 MG/DL
WBC # BLD: 7.2 K/UL (ref 4–11)

## 2022-03-07 PROCEDURE — G8484 FLU IMMUNIZE NO ADMIN: HCPCS | Performed by: NURSE PRACTITIONER

## 2022-03-07 PROCEDURE — 93000 ELECTROCARDIOGRAM COMPLETE: CPT | Performed by: NURSE PRACTITIONER

## 2022-03-07 PROCEDURE — 99396 PREV VISIT EST AGE 40-64: CPT | Performed by: NURSE PRACTITIONER

## 2022-03-07 RX ORDER — AMLODIPINE BESYLATE 5 MG/1
5 TABLET ORAL DAILY
Qty: 30 TABLET | Refills: 1 | Status: SHIPPED
Start: 2022-03-07 | End: 2022-04-04 | Stop reason: DRUGHIGH

## 2022-03-07 RX ORDER — LISINOPRIL 5 MG/1
5 TABLET ORAL DAILY
Qty: 30 TABLET | Refills: 1 | Status: SHIPPED | OUTPATIENT
Start: 2022-03-07 | End: 2022-03-07 | Stop reason: ALTCHOICE

## 2022-03-07 SDOH — ECONOMIC STABILITY: FOOD INSECURITY: WITHIN THE PAST 12 MONTHS, YOU WORRIED THAT YOUR FOOD WOULD RUN OUT BEFORE YOU GOT MONEY TO BUY MORE.: NEVER TRUE

## 2022-03-07 SDOH — ECONOMIC STABILITY: FOOD INSECURITY: WITHIN THE PAST 12 MONTHS, THE FOOD YOU BOUGHT JUST DIDN'T LAST AND YOU DIDN'T HAVE MONEY TO GET MORE.: NEVER TRUE

## 2022-03-07 ASSESSMENT — PATIENT HEALTH QUESTIONNAIRE - PHQ9
SUM OF ALL RESPONSES TO PHQ QUESTIONS 1-9: 0
1. LITTLE INTEREST OR PLEASURE IN DOING THINGS: 0
SUM OF ALL RESPONSES TO PHQ QUESTIONS 1-9: 0
2. FEELING DOWN, DEPRESSED OR HOPELESS: 0
SUM OF ALL RESPONSES TO PHQ QUESTIONS 1-9: 0
SUM OF ALL RESPONSES TO PHQ9 QUESTIONS 1 & 2: 0
SUM OF ALL RESPONSES TO PHQ QUESTIONS 1-9: 0

## 2022-03-07 ASSESSMENT — ENCOUNTER SYMPTOMS
BACK PAIN: 0
SHORTNESS OF BREATH: 0
ABDOMINAL PAIN: 0
CONSTIPATION: 1
SORE THROAT: 0
CHEST TIGHTNESS: 0
DIARRHEA: 0
VOMITING: 0
BLOOD IN STOOL: 0
COLOR CHANGE: 0
COUGH: 0
NAUSEA: 0
WHEEZING: 0

## 2022-03-07 ASSESSMENT — SOCIAL DETERMINANTS OF HEALTH (SDOH): HOW HARD IS IT FOR YOU TO PAY FOR THE VERY BASICS LIKE FOOD, HOUSING, MEDICAL CARE, AND HEATING?: NOT HARD AT ALL

## 2022-03-07 ASSESSMENT — LIFESTYLE VARIABLES
HOW OFTEN DO YOU HAVE A DRINK CONTAINING ALCOHOL: MONTHLY OR LESS
HOW MANY STANDARD DRINKS CONTAINING ALCOHOL DO YOU HAVE ON A TYPICAL DAY: 1 OR 2

## 2022-03-07 NOTE — PATIENT INSTRUCTIONS
Patient Education        Raynaud's Phenomenon: Care Instructions  Overview  Raynaud's is a condition that causes your hands and feet to overreact to cold. They may become painful and numb, and they can change colors, becoming very pale and then blue. This condition also is called Raynaud's phenomenon. There are two kinds of Raynaud's. Primary Raynaud's, also known as Raynaud's disease, happens by itself and is the most common form. Secondary Raynaud's, also called Raynaud's syndrome, happens as part of another disease. In Raynaud's, the small vessels that bring blood to the skin either become narrow, or constrict for a short period of time. This limits blood flow to the hands and feet and sometimes to the nose or ears. Your hands and feet feel cold and numb and then turn very pale. As blood flow returns, your fingers and toes may turn red, and begin to throb and hurt. Raynaud's can be painful and annoying, but it usually does not cause serious problems. You can take simple steps to protect your hands and feet from the cold. If you have a bad case of Raynaud's and you cannot keep your hands and feet warm enough, your doctor may prescribe medicine. Follow-up care is a key part of your treatment and safety. Be sure to make and go to all appointments, and call your doctor if you are having problems. It's also a good idea to know your test results and keep a list of the medicines you take. How can you care for yourself at home? To prevent Raynaud's episodes or ease symptoms  · Run warm water over your hands or feet to increase blood flow. Use another part of your body, such as your forearm, to make sure the water is not too hot; you could burn your hands or feet and not feel it because they are numb. · Swing your arms in a [place] at the sides of your body (\"windmilling\") to increase blood flow. · If your doctor prescribes medicine to help Raynaud's, take it exactly as prescribed.  Call your doctor if you think you are having a problem with your medicine. · If another condition causes your Raynaud's, make sure to follow your treatment for that condition. · Wear mittens or gloves when it is cold outside. Mittens are warmer than gloves because they keep your fingers together. Gloves underneath mittens will keep your hands warmer than gloves alone. You also can use pot holders or oven mitts when getting something from the freezer or refrigerator. · You can slip chemical hand warmers into your mittens or gloves when you do outside activities. · Do not smoke. Nicotine makes blood vessels constrict, which can bring on an attack. If you need help quitting, talk to your doctor about stop-smoking programs and medicines. These can increase your chances of quitting for good. · Avoid caffeine and cold medicines that have pseudoephedrine. They make blood vessels constrict. Beta-blocker medicines, often used to treat high blood pressure, also can make Raynaud's worse. · Drink plenty of fluids to prevent dehydration, which can lower the amount of blood moving through the blood vessels. If you have kidney, heart, or liver disease and have to limit fluids, talk with your doctor before you increase the amount of fluids you drink. · Try to stay calm when you are under stress. Anxiety can make your blood vessels constrict and lead to a Raynaud's attack. To keep your whole body warm  · Eat a hot meal and drink a warm liquid before going outside. They may help raise your body temperature. · Wear layers of warm clothing. The inner layer should be made of a material such as polypropylene that pulls moisture away from your body. · Wear a hat. · Do not wear clothing that is too tight. It can decrease or cut off blood flow. · Try to stay dry. Choose waterproof, breathable jackets and boots. Being wet makes you more likely to become chilled. When should you call for help?    Call your doctor now or seek immediate medical care if:    · You have severe pain in your hands or feet.     · Normal color does not return to your hands or feet.     · Your hands or feet do not warm up even after home care. Watch closely for changes in your health, and be sure to contact your doctor if you have any problems. Where can you learn more? Go to https://[email or web address] org and sign in to your [email or web address] account. Enter P043 in the Testin box to learn more about \"Raynaud's Phenomenon: Care Instructions. \"     If you do not have an account, please click on the \"Sign Up Now\" link. Current as of: April 30, 2021               Content Version: 13.1  © 2006-2021 [employer]. Care instructions adapted under license by [facility] ([facility]). If you have questions about a medical condition or this instruction, always ask your healthcare professional. [place] [address] any warranty or liability for your use of this information. Patient Education        Low Blood Pressure: Care Instructions  Your Care Instructions     Blood pressure is a measurement of the force of the blood against the walls of the blood vessels during and after each beat of the heart. Low blood pressure is also called hypotension. It means that your blood pressure is much lower than normal. Some people, especially young, slim women, may have slightly low blood pressure without symptoms. But in many people, low blood pressure can cause symptoms such as feeling dizzy or lightheaded. When your blood pressure is too low, your heart, brain, and other organs do not get enough blood. Low blood pressure can be caused by many things, including heart problems and some medicines. Diabetes that is not under control can cause your blood pressure to drop. And so can a severe allergic reaction or infection. Another cause is dehydration, which is when your body loses too much fluid. Treatment for low blood pressure depends on the cause.   Follow-up care is a key part of your treatment and safety. Be sure to make and go to all appointments, and call your doctor if you are having problems. It's also a good idea to know your test results and keep a list of the medicines you take. How can you care for yourself at home? · Be safe with medicines. Call your doctor if you think you are having a problem with your medicine. You will get more details on the specific medicines your doctor prescribes. · If you feel dizzy or lightheaded, sit down or lie down for a few minutes. Or you can sit down and put your head between your knees. This will help your blood pressure go back to normal and help your symptoms go away. · Follow your doctor's suggestions for ways to prevent symptoms like dizziness. These suggestions may include:  ? Get up slowly from bed or after sitting for a long time. If you are in bed, roll to your side and swing your legs over the edge of the bed and onto the floor. Push your body up to a sitting position. Wait for a while before you slowly stand up.  ? Add more salt to your diet, if your doctor recommends it. ? Drink plenty of fluids. Choose water and other clear liquids. If you have kidney, heart, or liver disease and have to limit fluids, talk with your doctor before you increase the amount of fluids you drink. ? Avoid or limit alcohol to 2 drinks a day for men and 1 drink a day for women. Alcohol may interfere with your medicine. In addition, alcohol can make your low blood pressure worse by causing your body to lose water. ? Wear compression stockings to help improve blood flow. When should you call for help? Call 911 anytime you think you may need emergency care. For example, call if:    · You passed out (lost consciousness). Call your doctor now or seek immediate medical care if:    · You are dizzy or lightheaded, or you feel like you may faint.    Watch closely for changes in your health, and be sure to contact your doctor if you have any problems. Where can you learn more? Go to https://chpepiceweb.[email or web address] org and sign in to your [employer] account. Enter C304 in the [employer] box to learn more about \"Low Blood Pressure: Care Instructions. \"     If you do not have an account, please click on the \"Sign Up Now\" link. Current as of: April 29, 2021               Content Version: 13.1  © 2006-2021 [employer]. Care instructions adapted under license by [facility] ([facility]). If you have questions about a medical condition or this instruction, always ask your healthcare professional. [place] [address] any warranty or liability for your use of this information. Patient Education        DASH Diet: Care Instructions  Your Care Instructions     The DASH diet is an eating plan that can help lower your blood pressure. DASH stands for Dietary Approaches to Stop Hypertension. Hypertension is high blood pressure. The DASH diet focuses on eating foods that are high in calcium, potassium, and magnesium. These nutrients can lower blood pressure. The foods that are highest in these nutrients are fruits, vegetables, low-fat dairy products, nuts, seeds, and legumes. But taking calcium, potassium, and magnesium supplements instead of eating foods that are high in those nutrients does not have the same effect. The DASH diet also includes whole grains, fish, and poultry. The DASH diet is one of several lifestyle changes your doctor may recommend to lower your high blood pressure. Your doctor may also want you to decrease the amount of sodium in your diet. Lowering sodium while following the DASH diet can lower blood pressure even further than just the DASH diet alone. Follow-up care is a key part of your treatment and safety. Be sure to make and go to all appointments, and call your doctor if you are having problems. It's also a good idea to know your test results and keep a list of the medicines you take.   How can you care for yourself at home? Following the DASH diet  · Eat 4 to 5 servings of fruit each day. A serving is 1 medium-sized piece of fruit, ½ cup chopped or canned fruit, 1/4 cup dried fruit, or 4 ounces (½ cup) of fruit juice. Choose fruit more often than fruit juice. · Eat 4 to 5 servings of vegetables each day. A serving is 1 cup of lettuce or raw leafy vegetables, ½ cup of chopped or cooked vegetables, or 4 ounces (½ cup) of vegetable juice. Choose vegetables more often than vegetable juice. · Get 2 to 3 servings of low-fat and fat-free dairy each day. A serving is 8 ounces of milk, 1 cup of yogurt, or 1 ½ ounces of cheese. · Eat 6 to 8 servings of grains each day. A serving is 1 slice of bread, 1 ounce of dry cereal, or ½ cup of cooked rice, pasta, or cooked cereal. Try to choose whole-grain products as much as possible. · Limit lean meat, poultry, and fish to 2 servings each day. A serving is 3 ounces, about the size of a deck of cards. · Eat 4 to 5 servings of nuts, seeds, and legumes (cooked dried beans, lentils, and split peas) each week. A serving is 1/3 cup of nuts, 2 tablespoons of seeds, or ½ cup of cooked beans or peas. · Limit fats and oils to 2 to 3 servings each day. A serving is 1 teaspoon of vegetable oil or 2 tablespoons of salad dressing. · Limit sweets and added sugars to 5 servings or less a week. A serving is 1 tablespoon jelly or jam, ½ cup sorbet, or 1 cup of lemonade. · Eat less than 2,300 milligrams (mg) of sodium a day. If you limit your sodium to 1,500 mg a day, you can lower your blood pressure even more. · Be aware that all of these are the suggested number of servings for people who eat 1,800 to 2,000 calories a day. Your recommended number of servings may be different if you need more or fewer calories. Tips for success  · Start small. Do not try to make dramatic changes to your diet all at once.  You might feel that you are missing out on your favorite foods and then be

## 2022-03-31 ENCOUNTER — TELEPHONE (OUTPATIENT)
Dept: SURGERY | Age: 42
End: 2022-03-31

## 2022-03-31 DIAGNOSIS — L30.9 ECZEMA, UNSPECIFIED TYPE: ICD-10-CM

## 2022-04-01 NOTE — TELEPHONE ENCOUNTER
Patient has been scheduled for:    Procedure: Colonoscopy  Date: 4/21/22  Time: 11:00AM  Arrival: 9:30AM  [facility]  [facility]?: N/A  Prep? Colon, reviewed by phone and emailed     Pre-op? N/A    Post-op Appt? N/A     Patient advised they will need a . Orders routed to surgery scheduling. Instructions have been emailed to:  [email or web address]

## 2022-04-04 ENCOUNTER — OFFICE VISIT (OUTPATIENT)
Dept: PRIMARY CARE CLINIC | Age: 42
End: 2022-04-04
Payer: COMMERCIAL

## 2022-04-04 ENCOUNTER — TELEPHONE (OUTPATIENT)
Dept: FAMILY MEDICINE CLINIC | Age: 42
End: 2022-04-04

## 2022-04-04 VITALS
HEIGHT: 62 IN | HEART RATE: 86 BPM | OXYGEN SATURATION: 98 % | TEMPERATURE: 98.5 F | WEIGHT: 185 LBS | BODY MASS INDEX: 34.04 KG/M2 | SYSTOLIC BLOOD PRESSURE: 138 MMHG | DIASTOLIC BLOOD PRESSURE: 95 MMHG

## 2022-04-04 DIAGNOSIS — I10 HYPERTENSION, UNSPECIFIED TYPE: Primary | ICD-10-CM

## 2022-04-04 DIAGNOSIS — R61 HYPERHIDROSIS: ICD-10-CM

## 2022-04-04 DIAGNOSIS — R22.31 SKIN LUMP OF ARM, RIGHT: ICD-10-CM

## 2022-04-04 PROCEDURE — 1036F TOBACCO NON-USER: CPT | Performed by: NURSE PRACTITIONER

## 2022-04-04 PROCEDURE — G8427 DOCREV CUR MEDS BY ELIG CLIN: HCPCS | Performed by: NURSE PRACTITIONER

## 2022-04-04 PROCEDURE — G8417 CALC BMI ABV UP PARAM F/U: HCPCS | Performed by: NURSE PRACTITIONER

## 2022-04-04 PROCEDURE — 99214 OFFICE O/P EST MOD 30 MIN: CPT | Performed by: NURSE PRACTITIONER

## 2022-04-04 RX ORDER — AMLODIPINE BESYLATE 10 MG/1
10 TABLET ORAL DAILY
Qty: 30 TABLET | Refills: 1 | Status: SHIPPED | OUTPATIENT
Start: 2022-04-04 | End: 2022-06-06

## 2022-04-04 ASSESSMENT — ENCOUNTER SYMPTOMS
CHEST TIGHTNESS: 0
BLOOD IN STOOL: 0
VOMITING: 0
DIARRHEA: 0
SORE THROAT: 0
ABDOMINAL PAIN: 0
SHORTNESS OF BREATH: 0
CONSTIPATION: 1
COLOR CHANGE: 0
NAUSEA: 0
WHEEZING: 0
COUGH: 0
BACK PAIN: 0

## 2022-04-04 NOTE — PROGRESS NOTES
ENCOUNTER DATE: 2022     NAME: Donald Castro   AGE: 36 y.o. GENDER: female   YOB: 1980    Patient Active Problem List   Diagnosis    Eczema    Cyst of left ovary    History of classical  section    Genital herpes simplex    Chronic constipation    History of diverticulitis    Hypertension    Raynaud's phenomenon without gangrene    Hyperhidrosis      Allergies   Allergen Reactions    Latex Hives     Current Outpatient Medications on File Prior to Visit   Medication Sig Dispense Refill    triamcinolone (KENALOG) 0.1 % ointment APPLY TO AFFECTED AREA(S) TWO TIMES A DAY FOR ECZEMA 80 g 2    tranexamic acid (LYSTEDA) 650 MG TABS tablet Take 2 tablets by mouth 3 times daily 30 tablet 5    acyclovir (ZOVIRAX) 800 MG tablet TAKE ONE TABLET BY MOUTH TWICE A DAY FOR 10 DAYS 20 tablet 0    fluticasone (FLONASE) 50 MCG/ACT nasal spray SPRAY TWO SPRAYS IN EACH NOSTRIL ONCE DAILY 16 g 0    acyclovir (ZOVIRAX) 5 % ointment Apply topically every 3 hours. 30 g 1    clobetasol (TEMOVATE) 0.05 % cream Apply topically 2 times daily. , to bilateral  feet 60 g 1     No current facility-administered medications on file prior to visit. Social History     Tobacco Use    Smoking status: Never Smoker    Smokeless tobacco: Never Used   Substance Use Topics    Alcohol use: Yes     Comment: occasionally      CARE TEAM  Patient Care Team:  Amber Laraine Harada, APRN - CNP as PCP - General (Family Nurse Practitioner)  GEMA Musa CNP as PCP - [facility] Empaneled Provider    Chief Complaint   Patient presents with    Hypertension    Mass     under  righht  arm      HPI:   Patient is here for a follow up visit. HYPERTENSION:  Started on norvasc 5mg daily. Tolerating med. BP remains elevated. Overall feeling better. Has been going to gym few times per wk. Doing 30 min of cardio daily. No further headaches. No further neck pain. Has lost some wt and inches.    Hasn't checked BP at home. CSCOPE:  Scheduled for cscope    MAMMO  Scheduled. GYN:  Had genetic testing for ovarian testing 3mo ago. Still waiting on results. ROS:  Review of Systems   Constitutional: Negative for activity change, appetite change, chills, fatigue and unexpected weight change. HENT: Negative for congestion, ear pain, hearing loss, nosebleeds, postnasal drip, sneezing and sore throat. Respiratory: Negative for cough, chest tightness, shortness of breath and wheezing. Cardiovascular: Negative for chest pain, palpitations and leg swelling. Gastrointestinal: Positive for constipation (chronic). Negative for abdominal pain, blood in stool, diarrhea, nausea and vomiting. Genitourinary: Positive for menstrual problem (painful). Negative for difficulty urinating and dysuria. Musculoskeletal: Negative for arthralgias, back pain and neck pain. Skin: Negative for color change, pallor and rash. Neurological: Negative for dizziness, tremors, numbness and headaches. Psychiatric/Behavioral: Negative for agitation, dysphoric mood and sleep disturbance. The patient is nervous/anxious (increased stress). VITALS:  BP (!) 138/95   Pulse 86   Temp 98.5 °F (36.9 °C) (Oral)   Ht 5' 2\" (1.575 m)   Wt 185 lb (83.9 kg)   LMP 03/24/2022   SpO2 98%   BMI 33.84 kg/m²    BP Readings from Last 3 Encounters:   04/04/22 (!) 138/95   03/07/22 (!) 142/98   01/10/22 (!) 145/97     PE:  Physical Exam  Vitals and nursing note reviewed. Constitutional:       General: She is not in acute distress. Appearance: Normal appearance. She is well-developed and normal weight. She is not diaphoretic. HENT:      Head: Normocephalic and atraumatic. Cardiovascular:      Rate and Rhythm: Normal rate and regular rhythm. Heart sounds: Normal heart sounds. No murmur heard. No friction rub. Pulmonary:      Effort: Pulmonary effort is normal. No respiratory distress. Breath sounds: Normal breath sounds.  No stridor. No wheezing, rhonchi or rales. Chest:      Chest wall: Mass (small, pea sized raised, flesh colored lesion in right axilla. no drainage. no erythema. no induration/fluctuance.) present. Abdominal:      General: Abdomen is flat. There is no distension. Palpations: Abdomen is soft. Musculoskeletal:      Right lower leg: No edema. Left lower leg: No edema. Skin:     General: Skin is warm and dry. Coloration: Skin is not pale. Findings: No erythema or rash. Neurological:      General: No focal deficit present. Mental Status: She is alert and oriented to person, place, and time. Motor: No weakness. Gait: Gait normal.   Psychiatric:         Mood and Affect: Mood normal.         Behavior: Behavior normal.         Thought Content: Thought content normal.         Judgment: Judgment normal.        ASSESSMENT/PLAN:  1. Hypertension, unspecified type  Increase norvasc to 10mg. Instructed to check BP at home and keep log  Recheck in 3-4wks. Continue with diet/lifestyle modifications. - amLODIPine (NORVASC) 10 MG tablet; Take 1 tablet by mouth daily  Dispense: 30 tablet; Refill: 1    2. Hyperhidrosis  - aluminum chloride (DRYSOL) 20 % external solution; Apply topically nightly. Dispense: 60 mL; Refill: 5    3. Skin lump of arm, right  Discussed differentials. Appears to be previous infected hair/scar tissue. Will try polysporin. If worsens, will need derm referral for further eval/removal.      Return in about 4 weeks (around 5/2/2022) for blood pressure.      Electronically signed by GEMA Carter CNP on 4/4/2022 at 11:02 AM

## 2022-04-04 NOTE — TELEPHONE ENCOUNTER
Pt advised I have no record, and or results for the bric testing. Pt states she went to [address] [place], [place] [id number] lab to have her labs drawn for the bric testing. I will reach out to our rep regarding this, and discuss with patient when I get an   Answer.

## 2022-04-20 ENCOUNTER — TELEPHONE (OUTPATIENT)
Dept: SURGERY | Age: 42
End: 2022-04-20

## 2022-04-20 ENCOUNTER — ANESTHESIA EVENT (OUTPATIENT)
Dept: ENDOSCOPY | Age: 42
End: 2022-04-20
Payer: COMMERCIAL

## 2022-04-20 NOTE — TELEPHONE ENCOUNTER
I have placed a reminder call to patient for upcoming procedure. Did you speak directly to patient or leave a voicemail? Spoke to patient     Prep? NPO 12AM  Colonoscopy prep    Must have a  that is over the age of 25. Must be a friend or family member that can be responsible for signing them out after surgery.     Arrive at the main entrance [facility] at 9:30AM

## 2022-04-21 ENCOUNTER — ANESTHESIA (OUTPATIENT)
Dept: ENDOSCOPY | Age: 42
End: 2022-04-21
Payer: COMMERCIAL

## 2022-04-21 ENCOUNTER — TELEPHONE (OUTPATIENT)
Dept: SURGERY | Age: 42
End: 2022-04-21

## 2022-04-21 ENCOUNTER — HOSPITAL ENCOUNTER (OUTPATIENT)
Age: 42
Setting detail: OUTPATIENT SURGERY
Discharge: HOME | End: 2022-04-21
Attending: SURGERY | Admitting: SURGERY
Payer: COMMERCIAL

## 2022-04-21 VITALS
DIASTOLIC BLOOD PRESSURE: 99 MMHG | OXYGEN SATURATION: 100 % | TEMPERATURE: 96.8 F | BODY MASS INDEX: 32.57 KG/M2 | RESPIRATION RATE: 18 BRPM | HEART RATE: 65 BPM | HEIGHT: 62 IN | SYSTOLIC BLOOD PRESSURE: 144 MMHG | WEIGHT: 177 LBS

## 2022-04-21 VITALS
OXYGEN SATURATION: 99 % | SYSTOLIC BLOOD PRESSURE: 130 MMHG | DIASTOLIC BLOOD PRESSURE: 88 MMHG | RESPIRATION RATE: 23 BRPM

## 2022-04-21 LAB — PREGNANCY, URINE: NEGATIVE

## 2022-04-21 PROCEDURE — 2709999900 HC NON-CHARGEABLE SUPPLY: Performed by: SURGERY

## 2022-04-21 PROCEDURE — 3609027000 HC COLONOSCOPY: Performed by: SURGERY

## 2022-04-21 PROCEDURE — 45378 DIAGNOSTIC COLONOSCOPY: CPT | Performed by: SURGERY

## 2022-04-21 PROCEDURE — 6360000002 HC RX W HCPCS: Performed by: NURSE ANESTHETIST, CERTIFIED REGISTERED

## 2022-04-21 PROCEDURE — 3700000001 HC ADD 15 MINUTES (ANESTHESIA): Performed by: SURGERY

## 2022-04-21 PROCEDURE — 84703 CHORIONIC GONADOTROPIN ASSAY: CPT

## 2022-04-21 PROCEDURE — 2580000003 HC RX 258: Performed by: ANESTHESIOLOGY

## 2022-04-21 PROCEDURE — 3700000000 HC ANESTHESIA ATTENDED CARE: Performed by: SURGERY

## 2022-04-21 PROCEDURE — 2500000003 HC RX 250 WO HCPCS: Performed by: NURSE ANESTHETIST, CERTIFIED REGISTERED

## 2022-04-21 PROCEDURE — 7100000011 HC PHASE II RECOVERY - ADDTL 15 MIN: Performed by: SURGERY

## 2022-04-21 PROCEDURE — 7100000010 HC PHASE II RECOVERY - FIRST 15 MIN: Performed by: SURGERY

## 2022-04-21 RX ORDER — PROPOFOL 10 MG/ML
INJECTION, EMULSION INTRAVENOUS CONTINUOUS PRN
Status: DISCONTINUED | OUTPATIENT
Start: 2022-04-21 | End: 2022-04-21 | Stop reason: SDUPTHER

## 2022-04-21 RX ORDER — LIDOCAINE HYDROCHLORIDE 20 MG/ML
INJECTION, SOLUTION EPIDURAL; INFILTRATION; INTRACAUDAL; PERINEURAL PRN
Status: DISCONTINUED | OUTPATIENT
Start: 2022-04-21 | End: 2022-04-21 | Stop reason: SDUPTHER

## 2022-04-21 RX ORDER — SODIUM CHLORIDE, SODIUM LACTATE, POTASSIUM CHLORIDE, CALCIUM CHLORIDE 600; 310; 30; 20 MG/100ML; MG/100ML; MG/100ML; MG/100ML
INJECTION, SOLUTION INTRAVENOUS CONTINUOUS
Status: DISCONTINUED | OUTPATIENT
Start: 2022-04-21 | End: 2022-04-21 | Stop reason: HOSPADM

## 2022-04-21 RX ORDER — PROPOFOL 10 MG/ML
INJECTION, EMULSION INTRAVENOUS PRN
Status: DISCONTINUED | OUTPATIENT
Start: 2022-04-21 | End: 2022-04-21 | Stop reason: SDUPTHER

## 2022-04-21 RX ADMIN — PROPOFOL 50 MG: 10 INJECTION, EMULSION INTRAVENOUS at 11:27

## 2022-04-21 RX ADMIN — PROPOFOL 80 MG: 10 INJECTION, EMULSION INTRAVENOUS at 11:25

## 2022-04-21 RX ADMIN — PROPOFOL 150 MCG/KG/MIN: 10 INJECTION, EMULSION INTRAVENOUS at 11:25

## 2022-04-21 RX ADMIN — LIDOCAINE HYDROCHLORIDE 80 MG: 20 INJECTION, SOLUTION EPIDURAL; INFILTRATION; INTRACAUDAL; PERINEURAL at 11:25

## 2022-04-21 RX ADMIN — PROPOFOL 50 MG: 10 INJECTION, EMULSION INTRAVENOUS at 11:28

## 2022-04-21 RX ADMIN — SODIUM CHLORIDE, POTASSIUM CHLORIDE, SODIUM LACTATE AND CALCIUM CHLORIDE: 600; 310; 30; 20 INJECTION, SOLUTION INTRAVENOUS at 10:27

## 2022-04-21 ASSESSMENT — PULMONARY FUNCTION TESTS
PIF_VALUE: 1

## 2022-04-21 ASSESSMENT — LIFESTYLE VARIABLES: SMOKING_STATUS: 0

## 2022-04-21 ASSESSMENT — PAIN - FUNCTIONAL ASSESSMENT: PAIN_FUNCTIONAL_ASSESSMENT: 0-10

## 2022-04-21 NOTE — TELEPHONE ENCOUNTER
----- Message from Toby Romano MD sent at 4/21/2022 11:44 AM EDT -----  Hi Jennifer,    Normal colonoscopy except for diverticulosis. Next screening colonoscopy in 10 years. Thanks!   Drew Ramírez  - can you update  for 10 yrs

## 2022-04-21 NOTE — H&P
PRE-ENDOSCOPY H&P    Visit Date: 2022    History:     Anthony Barreto is a 36 y.o. female who presents today for endoscopy procedure. See A/P below for indications. Patient Active Problem List   Diagnosis    Eczema    Cyst of left ovary    History of classical  section    Genital herpes simplex    Chronic constipation    History of diverticulitis    Hypertension    Raynaud's phenomenon without gangrene    Hyperhidrosis     Scheduled Meds:  Continuous Infusions:   lactated ringers 100 mL/hr at 22 1027     PRN Meds:. Prior to Admission medications    Medication Sig Start Date End Date Taking? Authorizing Provider   amLODIPine (NORVASC) 10 MG tablet Take 1 tablet by mouth daily 22   GEMA Garcia CNP   aluminum chloride (DRYSOL) 20 % external solution Apply topically nightly. 22   GEMA Greene CNP   triamcinolone (KENALOG) 0.1 % ointment APPLY TO AFFECTED AREA(S) TWO TIMES A DAY FOR ECZEMA 3/31/22   GEMA Garcia CNP   tranexamic acid (LYSTEDA) 650 MG TABS tablet Take 2 tablets by mouth 3 times daily 33   Agus Black MD   acyclovir (ZOVIRAX) 800 MG tablet TAKE ONE TABLET BY MOUTH TWICE A DAY FOR 10 DAYS 1/10/22   GEMA Greene CNP   fluticasone (FLONASE) 50 MCG/ACT nasal spray SPRAY TWO SPRAYS IN EACH NOSTRIL ONCE DAILY 10/19/21   Thien Rhodes MD   acyclovir (ZOVIRAX) 5 % ointment Apply topically every 3 hours. 10/19/21   Thien Rhodes MD   clobetasol (TEMOVATE) 0.05 % cream Apply topically 2 times daily. , to bilateral  feet 10/19/21   Thien Rhodes MD     Allergies   Allergen Reactions    Latex Hives     Past Medical History:   Diagnosis Date    Diverticulitis     Diverticulosis     H/O Raynaud's syndrome 1/10/2022    Hypertension      Past Surgical History:   Procedure Laterality Date     SECTION      OTHER SURGICAL HISTORY      had mirena placed which ruptured from the uterus and had surgery to remove through belly button       Physical Exam:     BP (!) 137/99   Pulse 85   Temp 97.6 °F (36.4 °C) (Temporal)   Resp 18   Ht 5' 2\" (1.575 m)   Wt 177 lb (80.3 kg)   LMP 03/24/2022   SpO2 98%   BMI 32.37 kg/m²  Body mass index is 32.37 kg/m². Constitutional: Appears well-developed and well-nourished. Grooming appropriate. Head: Normocephalic, atraumatic. Eyes: No scleral icterus. Vision intact grossly. ENT: Hearing grossly intact. No facial deformity. Cardiovascular: Normal rate on monitor. Pulmonary/Chest: Effort normal. No respiratory distress. No wheezes. No use of accessory muscles. Musculoskeletal: Normal range of motion of UE. No gross deformity. Neurological: Alert and oriented to person, place, and time. No gross deficits. Psychiatric: Normal mood and affect. Behavior normal. Oriented to person, place, and time. Abdomen: soft, NTTP, non distended    Recent labs/radiology reviewed as appropriate    Assessment/Plan:     Previous colonoscopy: no  Family history of colorectal cancer: no  Symptoms: yes - chronic constipation    Anesthesia to provide sedation. Please see their documentation regarding airway and ASA classification. Proceed as planned for endoscopy, possible polypectomy    Risks/benefits/alternatives of procedure discussed with patient and any present family members. Risks including, but not limited to: bleeding, perforation, post polypectomy syndrome, splenic injury, need for additional procedures or surgery, risks of anesthesia. Patient understands it is their responsibility to call office for pathology results if they do not hear from my office within 1-2 weeks. All questions answered.     Electronically signed by Rosaline Madrid MD on 4/21/2022 at 11:22 AM

## 2022-04-21 NOTE — ANESTHESIA POSTPROCEDURE EVALUATION
Department of Anesthesiology  Postprocedure Note    Patient: Matias Cook  MRN: [id number]  YOB: 1980  Date of evaluation: 4/21/2022  Time:  12:01 PM     Procedure Summary     Date: 04/21/22 Room / Location: [address] [place] [address] Gracie Aj  / [facility]    Anesthesia Start: 3295 Anesthesia Stop: 3082    Procedure: COLONOSCOPY, POSSIBLE POLYECTOMY (N/A ) Diagnosis:       Screening for colon cancer      (Screening for colon cancer [Z12.11])    Surgeons: Susan Angeles MD Responsible Provider: Magda Hollis DO    Anesthesia Type: MAC ASA Status: 2          Anesthesia Type: MAC    Elina Phase I: Elina Score: 10    Elina Phase II: Elina Score: 10    Last vitals: Reviewed and per EMR flowsheets.        Anesthesia Post Evaluation    Patient location during evaluation: PACU  Patient participation: complete - patient participated  Level of consciousness: awake and alert  Pain score: 0  Airway patency: patent  Nausea & Vomiting: no nausea and no vomiting  Complications: no  Cardiovascular status: hemodynamically stable  Respiratory status: acceptable  Hydration status: stable

## 2022-04-21 NOTE — ANESTHESIA PRE PROCEDURE
Department of Anesthesiology  Preprocedure Note       Name:  Ivan Laurent   Age:  36 y.o.  :  1980                                          MRN:  [id number]         Date:  2022      Surgeon: Brayden Zhu):  Navjot Crisostomo MD    Procedure: Procedure(s):  COLONOSCOPY, POSSIBLE POLYECTOMY    Medications prior to admission:   Prior to Admission medications    Medication Sig Start Date End Date Taking? Authorizing Provider   amLODIPine (NORVASC) 10 MG tablet Take 1 tablet by mouth daily 22   Deepti Liberty, APRN - CNP   aluminum chloride (DRYSOL) 20 % external solution Apply topically nightly. 22   Jennifer Jasmyne Houston, APRN - CNP   triamcinolone (KENALOG) 0.1 % ointment APPLY TO AFFECTED AREA(S) TWO TIMES A DAY FOR ECZEMA 3/31/22   Deepti Liberty, APRN - CNP   tranexamic acid (LYSTEDA) 650 MG TABS tablet Take 2 tablets by mouth 3 times daily 30   Magda Bran MD   acyclovir (ZOVIRAX) 800 MG tablet TAKE ONE TABLET BY MOUTH TWICE A DAY FOR 10 DAYS 1/10/22   Jennifer Jasmyne Houston, APRN - CNP   fluticasone (FLONASE) 50 MCG/ACT nasal spray SPRAY TWO SPRAYS IN EACH NOSTRIL ONCE DAILY 10/19/21   Annamaria Wilcox MD   acyclovir (ZOVIRAX) 5 % ointment Apply topically every 3 hours. 10/19/21   Annamaria Wilcox MD   clobetasol (TEMOVATE) 0.05 % cream Apply topically 2 times daily. , to bilateral  feet 10/19/21   Annamaria Wilcox MD       Current medications:    No current facility-administered medications for this encounter. Allergies:     Allergies   Allergen Reactions    Latex Hives       Problem List:    Patient Active Problem List   Diagnosis Code    Eczema L30.9    Cyst of left ovary N83.202    History of classical  section Z98.891    Genital herpes simplex A60.00    Chronic constipation K59.09    History of diverticulitis Z87.19    Hypertension I10    Raynaud's phenomenon without gangrene I73.00    Hyperhidrosis R61       Past Medical History:        Diagnosis Date    Diverticulitis     Diverticulosis     H/O Raynaud's syndrome 1/10/2022    Hypertension        Past Surgical History:        Procedure Laterality Date     SECTION      OTHER SURGICAL HISTORY      had mirena placed which ruptured from the uterus and had surgery to remove through belly button       Social History:    Social History     Tobacco Use    Smoking status: Never Smoker    Smokeless tobacco: Never Used   Substance Use Topics    Alcohol use: Yes     Comment: occasionally                                Counseling given: Not Answered      Vital Signs (Current):   Vitals:    22 0941   BP: (!) 137/99   Pulse: 85   Resp: 18   Temp: 97.6 °F (36.4 °C)   TempSrc: Temporal   SpO2: 98%   Weight: 177 lb (80.3 kg)   Height: 5' 2\" (1.575 m)                                              BP Readings from Last 3 Encounters:   22 (!) 137/99   22 (!) 138/95   22 (!) 142/98       NPO Status:                                                   Date of last liquid consumption: 22                        Date of last solid food consumption: 22    BMI:   Wt Readings from Last 3 Encounters:   22 177 lb (80.3 kg)   22 185 lb (83.9 kg)   22 181 lb 12.8 oz (82.5 kg)     Body mass index is 32.37 kg/m².     CBC:   Lab Results   Component Value Date    WBC 7.2 2022    RBC 4.07 2022    HGB 14.0 2022    HCT 41.4 2022    .7 2022    RDW 14.0 2022     2022       CMP:   Lab Results   Component Value Date     2022    K 4.1 2022     2022    CO2 24 2022    BUN 8 2022    CREATININE 0.6 2022    GFRAA >60 2022    AGRATIO 1.6 2022    LABGLOM >60 2022    GLUCOSE 84 2022    PROT 7.0 2022    CALCIUM 9.1 2022    BILITOT 0.5 2022    ALKPHOS 90 2022    AST 17 2022    ALT 13 2022       POC Tests: No results for input(s): POCGLU, POCNA, POCK, POCCL, POCBUN, POCHEMO, POCHCT in the last 72 hours. Coags: No results found for: PROTIME, INR, APTT    HCG (If Applicable):   Lab Results   Component Value Date    PREGTESTUR Negative 03/02/2018        ABGs: No results found for: PHART, PO2ART, GDB7PCS, OTZ7GJB, BEART, C8OOHLLW     Type & Screen (If Applicable):  No results found for: LABABO, LABRH    Drug/Infectious Status (If Applicable):  No results found for: HIV, HEPCAB    COVID-19 Screening (If Applicable):   Lab Results   Component Value Date    COVID19 Detected 01/10/2022           Anesthesia Evaluation  Patient summary reviewed and Nursing notes reviewed no history of anesthetic complications:   Airway: Mallampati: II  TM distance: >3 FB   Neck ROM: full  Mouth opening: > = 3 FB Dental: normal exam         Pulmonary:normal exam        (-) not a current smoker (quit 15 yrs ago)                          ROS comment: cpvid 6 months ago mild case  Was not vaxed   Cardiovascular:  Exercise tolerance: good (>4 METS),   (+) hypertension: moderate,     (-) past MI    NYHA Classification: I    Rhythm: regular  Rate: normal           Beta Blocker:  Not on Beta Blocker         Neuro/Psych:   Negative Neuro/Psych ROS              GI/Hepatic/Renal:   (+) bowel prep,      (-) GERD      ROS comment: Hx of diverticulitis   . Endo/Other: Negative Endo/Other ROS                    Abdominal:             Vascular: negative vascular ROS. Other Findings:             Anesthesia Plan      MAC     ASA 2       Induction: intravenous. MIPS: Prophylactic antiemetics administered. Anesthetic plan and risks discussed with patient. Plan discussed with CRNA.     Attending anesthesiologist reviewed and agrees with Preprocedure content              Rocio Ordoñez DO   4/21/2022

## 2022-05-04 DIAGNOSIS — I10 HYPERTENSION, UNSPECIFIED TYPE: ICD-10-CM

## 2022-05-04 RX ORDER — LISINOPRIL 5 MG/1
TABLET ORAL
Qty: 30 TABLET | Refills: 1 | OUTPATIENT
Start: 2022-05-04

## 2022-05-04 NOTE — TELEPHONE ENCOUNTER
Medication:   Requested Prescriptions     Pending Prescriptions Disp Refills    lisinopril (PRINIVIL;ZESTRIL) 5 MG tablet [Pharmacy Med Name: LISINOPRIL 5 MG TABLET] 30 tablet 1     Sig: TAKE ONE TABLET BY MOUTH DAILY     Last Filled: 3.7.22  Last appt: 4/4/2022   Next appt: Visit date not found    Last OARRS: No flowsheet data found.

## 2022-05-09 ENCOUNTER — OFFICE VISIT (OUTPATIENT)
Dept: GYNECOLOGY | Age: 42
End: 2022-05-09
Payer: COMMERCIAL

## 2022-05-09 VITALS
DIASTOLIC BLOOD PRESSURE: 97 MMHG | BODY MASS INDEX: 33.49 KG/M2 | SYSTOLIC BLOOD PRESSURE: 149 MMHG | HEART RATE: 98 BPM | TEMPERATURE: 97.9 F | HEIGHT: 62 IN | OXYGEN SATURATION: 98 % | RESPIRATION RATE: 18 BRPM | WEIGHT: 182 LBS

## 2022-05-09 DIAGNOSIS — R30.0 DYSURIA: ICD-10-CM

## 2022-05-09 DIAGNOSIS — N89.8 VAGINAL DISCHARGE: Primary | ICD-10-CM

## 2022-05-09 LAB
BILIRUBIN, POC: NEGATIVE
BLOOD URINE, POC: ABNORMAL
CLARITY, POC: ABNORMAL
COLOR, POC: ABNORMAL
GLUCOSE URINE, POC: NEGATIVE
KETONES, POC: NEGATIVE
LEUKOCYTE EST, POC: ABNORMAL
NITRITE, POC: NEGATIVE
PH, POC: 5.5
PROTEIN, POC: NEGATIVE
SPECIFIC GRAVITY, POC: 1.01
UROBILINOGEN, POC: 0.2

## 2022-05-09 PROCEDURE — 99213 OFFICE O/P EST LOW 20 MIN: CPT | Performed by: OBSTETRICS & GYNECOLOGY

## 2022-05-09 PROCEDURE — G8427 DOCREV CUR MEDS BY ELIG CLIN: HCPCS | Performed by: OBSTETRICS & GYNECOLOGY

## 2022-05-09 PROCEDURE — 1036F TOBACCO NON-USER: CPT | Performed by: OBSTETRICS & GYNECOLOGY

## 2022-05-09 PROCEDURE — G8417 CALC BMI ABV UP PARAM F/U: HCPCS | Performed by: OBSTETRICS & GYNECOLOGY

## 2022-05-09 PROCEDURE — 81002 URINALYSIS NONAUTO W/O SCOPE: CPT | Performed by: OBSTETRICS & GYNECOLOGY

## 2022-05-09 NOTE — PROGRESS NOTES
Subjective:      Patient ID: Mariama Lyn is a 36 y.o. female. HPI  pts here for several day h/o vag d/c. She is up to date with mammogram.  Had colonoscopy. I reviewed BRCA testing with pt regarding no mutation and recommendations for screening. Denies other complaints. Review of Systems Pertinent review of systems items discussed above. All others systems items not discussed above were negative. Objective:   Physical Exam    Af, elevated bp- advised pt to f/u with her pcp  Ext- no lesions  Meatus- no lesions  Bladder- good support  Vag- scant d/c  cx- no lesions  Affirm    Assessment:   Vag d/c     Plan:   F/u annual gyn exam.  Call with results.        Juan Manuel Rosales MD

## 2022-05-10 DIAGNOSIS — N89.8 VAGINAL DISCHARGE: Primary | ICD-10-CM

## 2022-05-10 LAB
CANDIDA SPECIES, DNA PROBE: ABNORMAL
GARDNERELLA VAGINALIS, DNA PROBE: ABNORMAL
TRICHOMONAS VAGINALIS DNA: ABNORMAL
URINE CULTURE, ROUTINE: NORMAL

## 2022-05-10 RX ORDER — NITROFURANTOIN 25; 75 MG/1; MG/1
100 CAPSULE ORAL 2 TIMES DAILY
Qty: 14 CAPSULE | Refills: 0 | Status: SHIPPED | OUTPATIENT
Start: 2022-05-10 | End: 2022-05-24

## 2022-05-10 RX ORDER — FLUCONAZOLE 150 MG/1
150 TABLET ORAL ONCE
Qty: 1 TABLET | Refills: 1 | Status: SHIPPED | OUTPATIENT
Start: 2022-05-10 | End: 2022-05-10

## 2022-05-26 ENCOUNTER — HOSPITAL ENCOUNTER (OUTPATIENT)
Dept: WOMENS IMAGING | Age: 42
Discharge: HOME | End: 2022-05-26
Payer: COMMERCIAL

## 2022-05-26 DIAGNOSIS — J01.01 ACUTE RECURRENT MAXILLARY SINUSITIS: ICD-10-CM

## 2022-05-26 DIAGNOSIS — Z12.31 ENCOUNTER FOR SCREENING MAMMOGRAM FOR MALIGNANT NEOPLASM OF BREAST: ICD-10-CM

## 2022-05-26 DIAGNOSIS — Z00.00 ANNUAL PHYSICAL EXAM: ICD-10-CM

## 2022-05-26 PROCEDURE — 77063 BREAST TOMOSYNTHESIS BI: CPT

## 2022-05-27 RX ORDER — FLUTICASONE PROPIONATE 50 MCG
SPRAY, SUSPENSION (ML) NASAL
Qty: 1 EACH | Refills: 3 | Status: SHIPPED | OUTPATIENT
Start: 2022-05-27

## 2022-05-27 NOTE — TELEPHONE ENCOUNTER
Requested Prescriptions     Pending Prescriptions Disp Refills    fluticasone (FLONASE) 50 MCG/ACT nasal spray [Pharmacy Med Name: FLUTICASONE PROP 50 MCG SPRAY]       Sig: SPRAY TWO SPRAYS IN EACH NOSTRIL ONCE DAILY     Recent Visits  Date Type Provider Dept   04/04/22 Office Visit GEMA Greene CNP [facility]   03/07/22 Office Visit GEMA Greene CNP [facility]   01/10/22 Office Visit GEMA Garcia - CNP [facility]   10/19/21 Office Visit Thien Rhodes MD [facility]   02/02/21 Office Visit Thien Rhodes MD [facility]   Showing recent visits within past 540 days with a meds authorizing provider and meeting all other requirements  Future Appointments  No visits were found meeting these conditions.   Showing future appointments within next 150 days with a meds authorizing provider and meeting all other requirements       LAST APPOINTMENT  10/19/2021     fluticasone (FLONASE) 50 MCG/ACT nasal spray [[id number]]     Order Details  Dose, Route, Frequency: As Directed   Dispense Quantity: 16 g Refills: 0          Sig: SPRAY TWO SPRAYS IN EACH NOSTRIL ONCE DAILY         Start Date: 10/19/21

## 2022-06-05 DIAGNOSIS — I10 HYPERTENSION, UNSPECIFIED TYPE: ICD-10-CM

## 2022-06-06 RX ORDER — AMLODIPINE BESYLATE 10 MG/1
TABLET ORAL
Qty: 30 TABLET | Refills: 2 | Status: SHIPPED
Start: 2022-06-06 | End: 2022-10-05

## 2022-06-06 NOTE — TELEPHONE ENCOUNTER
Medication:   Requested Prescriptions     Pending Prescriptions Disp Refills    amLODIPine (NORVASC) 10 MG tablet [Pharmacy Med Name: amLODIPine BESYLATE 10 MG TAB] 30 tablet 1     Sig: TAKE ONE TABLET BY MOUTH DAILY     Last Filled:  4.4.22  Last appt: 4/4/2022   Next appt: Visit date not found    Last OARRS: No flowsheet data found.

## 2022-06-13 ENCOUNTER — TELEPHONE (OUTPATIENT)
Dept: PRIMARY CARE CLINIC | Age: 42
End: 2022-06-13

## 2022-06-13 NOTE — TELEPHONE ENCOUNTER
----- Message from Majel Seip, APRN - CNP sent at 6/13/2022  9:26 AM EDT -----  Mammo normal. Repeat in 1 yr

## 2022-06-21 ENCOUNTER — OFFICE VISIT (OUTPATIENT)
Dept: GYNECOLOGY | Age: 42
End: 2022-06-21
Payer: COMMERCIAL

## 2022-06-21 VITALS — BODY MASS INDEX: 33 KG/M2 | SYSTOLIC BLOOD PRESSURE: 126 MMHG | DIASTOLIC BLOOD PRESSURE: 84 MMHG | WEIGHT: 180.4 LBS

## 2022-06-21 DIAGNOSIS — R39.9 UTI SYMPTOMS: Primary | ICD-10-CM

## 2022-06-21 DIAGNOSIS — N89.8 VAGINAL DISCHARGE: ICD-10-CM

## 2022-06-21 LAB
BILIRUBIN, POC: NEGATIVE
BLOOD URINE, POC: NORMAL
CLARITY, POC: NORMAL
COLOR, POC: NORMAL
GLUCOSE URINE, POC: NEGATIVE
KETONES, POC: NEGATIVE
LEUKOCYTE EST, POC: NEGATIVE
NITRITE, POC: NEGATIVE
PH, POC: 5.5
PROTEIN, POC: NEGATIVE
SPECIFIC GRAVITY, POC: >1.03
UROBILINOGEN, POC: 0.2

## 2022-06-21 PROCEDURE — G8417 CALC BMI ABV UP PARAM F/U: HCPCS | Performed by: OBSTETRICS & GYNECOLOGY

## 2022-06-21 PROCEDURE — 81002 URINALYSIS NONAUTO W/O SCOPE: CPT | Performed by: OBSTETRICS & GYNECOLOGY

## 2022-06-21 PROCEDURE — G8427 DOCREV CUR MEDS BY ELIG CLIN: HCPCS | Performed by: OBSTETRICS & GYNECOLOGY

## 2022-06-21 PROCEDURE — 1036F TOBACCO NON-USER: CPT | Performed by: OBSTETRICS & GYNECOLOGY

## 2022-06-21 PROCEDURE — 99213 OFFICE O/P EST LOW 20 MIN: CPT | Performed by: OBSTETRICS & GYNECOLOGY

## 2022-06-21 NOTE — PROGRESS NOTES
Subjective:      Patient ID: Tianna Jennings is a 36 y.o. female. HPI  pts here with return of vag d/c. Review of Systems Pertinent review of systems items discussed above. All others systems items not discussed above were negative. Objective:   Physical Exam    Af, vss  Ext- no lesions  Meatus- no lesions  Bladder- good support  Vag- scant d/c  cx- no lesions  Affirm, mycoplasma    Assessment:   Vag d/c     Plan:   Call with results. Planning to move in 2 months to [facility].   F/u annual gyn exam.       Mc Lira MD

## 2022-06-22 LAB
CANDIDA SPECIES, DNA PROBE: NORMAL
GARDNERELLA VAGINALIS, DNA PROBE: NORMAL
TRICHOMONAS VAGINALIS DNA: NORMAL

## 2022-06-25 LAB
FINAL REPORT: NORMAL
PRELIMINARY: NORMAL

## 2022-06-27 ENCOUNTER — TELEPHONE (OUTPATIENT)
Dept: GYNECOLOGY | Age: 42
End: 2022-06-27

## 2022-06-29 DIAGNOSIS — Z22.39 CARRIER OF UREAPLASMA UREALYTICUM: Primary | ICD-10-CM

## 2022-06-29 RX ORDER — AZITHROMYCIN 500 MG/1
500 TABLET, FILM COATED ORAL DAILY
Qty: 7 TABLET | Refills: 0 | Status: SHIPPED | OUTPATIENT
Start: 2022-06-29 | End: 2022-07-06

## 2022-07-12 ENCOUNTER — OFFICE VISIT (OUTPATIENT)
Dept: GYNECOLOGY | Age: 42
End: 2022-07-12
Payer: COMMERCIAL

## 2022-07-12 VITALS
WEIGHT: 181.4 LBS | BODY MASS INDEX: 33.18 KG/M2 | SYSTOLIC BLOOD PRESSURE: 138 MMHG | DIASTOLIC BLOOD PRESSURE: 84 MMHG | HEART RATE: 103 BPM

## 2022-07-12 DIAGNOSIS — Z3A.01 6 WEEKS GESTATION OF PREGNANCY: ICD-10-CM

## 2022-07-12 DIAGNOSIS — N91.2 AMENORRHEA: Primary | ICD-10-CM

## 2022-07-12 LAB — GONADOTROPIN, CHORIONIC (HCG) QUANT: 7976 MIU/ML

## 2022-07-12 PROCEDURE — G8427 DOCREV CUR MEDS BY ELIG CLIN: HCPCS | Performed by: OBSTETRICS & GYNECOLOGY

## 2022-07-12 PROCEDURE — 81025 URINE PREGNANCY TEST: CPT | Performed by: OBSTETRICS & GYNECOLOGY

## 2022-07-12 PROCEDURE — 99213 OFFICE O/P EST LOW 20 MIN: CPT | Performed by: OBSTETRICS & GYNECOLOGY

## 2022-07-12 PROCEDURE — G8417 CALC BMI ABV UP PARAM F/U: HCPCS | Performed by: OBSTETRICS & GYNECOLOGY

## 2022-07-12 PROCEDURE — 36415 COLL VENOUS BLD VENIPUNCTURE: CPT | Performed by: OBSTETRICS & GYNECOLOGY

## 2022-07-12 PROCEDURE — 1036F TOBACCO NON-USER: CPT | Performed by: OBSTETRICS & GYNECOLOGY

## 2022-07-12 NOTE — PROGRESS NOTES
Subjective:      Patient ID: Santiago Jackson is a 36 y.o. female. HPI  Pt's LMP in June. Late for period. No bleeding or pain. UPT positive. Denies drinking/smoking. Review of Systems Pertinent review of systems items discussed above. All others systems items not discussed above were negative. Objective:   Physical Exam    Assessment:   First trimester pregnancy     Plan:   Pt relates that her last pregnancy was complicated by vag bleeding and incompetent cervix necessitating a cerclage. Will refer to [facility]. Will check hcg quant. 20 min  >50% of time was spent discussing findings, management, and treatment options.          Velma Ortiz MD

## 2022-08-22 DIAGNOSIS — A60.00 GENITAL HERPES SIMPLEX, UNSPECIFIED SITE: ICD-10-CM

## 2022-08-22 RX ORDER — ACYCLOVIR 800 MG/1
TABLET ORAL
Qty: 20 TABLET | Refills: 2 | Status: SHIPPED | OUTPATIENT
Start: 2022-08-22

## 2022-08-22 NOTE — TELEPHONE ENCOUNTER
Medication:   Requested Prescriptions     Pending Prescriptions Disp Refills    acyclovir (ZOVIRAX) 800 MG tablet [Pharmacy Med Name: ACYCLOVIR 800 MG TABLET] 20 tablet 0     Sig: TAKE ONE TABLET BY MOUTH TWICE A DAY FOR 10 DAYS     Last Filled:  1.10.22    Last appt: 4/4/2022   Next appt: Visit date not found    Last OARRS: No flowsheet data found.

## 2022-10-05 ENCOUNTER — TELEMEDICINE (OUTPATIENT)
Dept: PRIMARY CARE CLINIC | Age: 42
End: 2022-10-05
Payer: COMMERCIAL

## 2022-10-05 DIAGNOSIS — I10 HYPERTENSION, UNSPECIFIED TYPE: ICD-10-CM

## 2022-10-05 DIAGNOSIS — J01.90 ACUTE NON-RECURRENT SINUSITIS, UNSPECIFIED LOCATION: Primary | ICD-10-CM

## 2022-10-05 DIAGNOSIS — Z3A.17 17 WEEKS GESTATION OF PREGNANCY: ICD-10-CM

## 2022-10-05 PROBLEM — K59.09 CHRONIC CONSTIPATION: Status: RESOLVED | Noted: 2022-03-07 | Resolved: 2022-10-05

## 2022-10-05 PROCEDURE — G8417 CALC BMI ABV UP PARAM F/U: HCPCS | Performed by: NURSE PRACTITIONER

## 2022-10-05 PROCEDURE — G8427 DOCREV CUR MEDS BY ELIG CLIN: HCPCS | Performed by: NURSE PRACTITIONER

## 2022-10-05 PROCEDURE — 99214 OFFICE O/P EST MOD 30 MIN: CPT | Performed by: NURSE PRACTITIONER

## 2022-10-05 PROCEDURE — 1036F TOBACCO NON-USER: CPT | Performed by: NURSE PRACTITIONER

## 2022-10-05 PROCEDURE — G8484 FLU IMMUNIZE NO ADMIN: HCPCS | Performed by: NURSE PRACTITIONER

## 2022-10-05 RX ORDER — AMOXICILLIN 875 MG/1
875 TABLET, COATED ORAL 2 TIMES DAILY
Qty: 14 TABLET | Refills: 0 | Status: SHIPPED | OUTPATIENT
Start: 2022-10-05 | End: 2022-10-12

## 2022-10-05 ASSESSMENT — ENCOUNTER SYMPTOMS
RHINORRHEA: 1
CHEST TIGHTNESS: 0
COLOR CHANGE: 0
COUGH: 1
ABDOMINAL PAIN: 0
SINUS PRESSURE: 1
WHEEZING: 0
TROUBLE SWALLOWING: 0
SORE THROAT: 0
SHORTNESS OF BREATH: 0
SINUS PAIN: 1
DIARRHEA: 0
NAUSEA: 0
VOMITING: 0

## 2022-10-05 NOTE — PROGRESS NOTES
10/5/2022    TELEHEALTH EVALUATION -- Audio/Visual (During WKSBI-97 public health emergency)    HPI:    Brooke Alba (:  1980) has requested an audio/video evaluation for the following concern(s):    Patient seen virtually for a sick visit    Onset: 1 wk ago with cold symptoms  Feels like she now has a sinus infection. Has sinus pressure and congestion. Drainage is yellow/green. No fevers. Some cough. Is productive with yellow sputum  A lot of sneezing. Covid test last wk and negative. Drinking green tea. Taking flonase, allegra, pataday eye gtts. Currently 17 weeks pregnant  Sees OB every 2 weeks due to being high risk  Had cerclage recently. BP stable. No longer taking BP meds. Review of Systems   Constitutional:  Negative for activity change, appetite change, chills, fatigue and fever. HENT:  Positive for congestion, postnasal drip, rhinorrhea, sinus pressure and sinus pain. Negative for ear pain, sore throat and trouble swallowing. Respiratory:  Positive for cough. Negative for chest tightness, shortness of breath and wheezing. Cardiovascular:  Negative for chest pain, palpitations and leg swelling. Gastrointestinal:  Negative for abdominal pain, diarrhea, nausea and vomiting. Genitourinary:  Negative for difficulty urinating. Musculoskeletal:  Negative for myalgias. Skin:  Negative for color change, pallor and rash. Neurological:  Negative for dizziness, weakness, light-headedness and headaches. Prior to Visit Medications    Medication Sig Taking?  Authorizing Provider   amoxicillin (AMOXIL) 875 MG tablet Take 1 tablet by mouth 2 times daily for 7 days Yes GEMA Velasquez CNP   acyclovir (ZOVIRAX) 800 MG tablet TAKE ONE TABLET BY MOUTH TWICE A DAY FOR 10 DAYS  GEMA Blankenship CNP   fluticasone (FLONASE) 50 MCG/ACT nasal spray SPRAY TWO SPRAYS IN EACH NOSTRIL ONCE DAILY  Thierry Lange MD   aluminum chloride (DRYSOL) 20 % external solution Apply topically nightly. GEMA Pedro CNP   triamcinolone (KENALOG) 0.1 % ointment APPLY TO AFFECTED AREA(S) TWO TIMES A DAY FOR ECZEMA  GEMA Blankenship CNP   tranexamic acid (LYSTEDA) 650 MG TABS tablet Take 2 tablets by mouth 3 times daily  Kathie Daniels MD   acyclovir (ZOVIRAX) 5 % ointment Apply topically every 3 hours. Danielle Mckeon MD   clobetasol (TEMOVATE) 0.05 % cream Apply topically 2 times daily. , to bilateral  feet  Danielle Mckeon MD       Social History     Tobacco Use    Smoking status: Never    Smokeless tobacco: Never   Vaping Use    Vaping Use: Never used   Substance Use Topics    Alcohol use: Yes     Comment: occasionally    Drug use: No        Allergies   Allergen Reactions    Latex Hives   ,   Past Medical History:   Diagnosis Date    Diverticulitis     Diverticulosis     H/O Raynaud's syndrome 1/10/2022    Hypertension        PHYSICAL EXAMINATION:  [ INSTRUCTIONS:  \"[x]\" Indicates a positive item  \"[]\" Indicates a negative item  -- DELETE ALL ITEMS NOT EXAMINED]  Vital Signs: (As obtained by patient/caregiver or practitioner observation)    Blood pressure-  Heart rate-    Respiratory rate-    Temperature-  Pulse oximetry-     Constitutional: [x] Appears well-developed and well-nourished [x] No apparent distress      [] Abnormal-   Mental status  [x] Alert and awake  [x] Oriented to person/place/time [x]Able to follow commands      Eyes:  EOM    []  Normal  [] Abnormal-  Sclera  []  Normal  [] Abnormal -         Discharge []  None visible  [] Abnormal -    HENT:   [x] Normocephalic, atraumatic.   [] Abnormal CONGESTION NOTED  [x] Mouth/Throat: Mucous membranes are moist.     External Ears [] Normal  [] Abnormal-     Neck: [x] No visualized mass     Pulmonary/Chest: [x] Respiratory effort normal.  [x] No visualized signs of difficulty breathing or respiratory distress        [] Abnormal-      Musculoskeletal:   [] Normal gait with no signs of ataxia         [x] Normal range of motion of neck        [] Abnormal-       Neurological:        [x] No Facial Asymmetry (Cranial nerve 7 motor function) (limited exam to video visit)          [x] No gaze palsy        [] Abnormal-         Skin:        [x] No significant exanthematous lesions or discoloration noted on facial skin         [] Abnormal-            Psychiatric:       [x] Normal Affect [x] No Hallucinations        [] Abnormal-     Other pertinent observable physical exam findings-     ASSESSMENT/PLAN:  1. Acute non-recurrent sinusitis, unspecified location  Start on anbx. Recommend to add sinus rinses. Continue on flonase and allegra. Increase fluids, rest   Will call if no improvement. - amoxicillin (AMOXIL) 875 MG tablet; Take 1 tablet by mouth 2 times daily for 7 days  Dispense: 14 tablet; Refill: 0    2. 17 weeks gestation of pregnancy  Continue per OB. 3. Hypertension, unspecified type  No longer taking BP med. Chart updated. Monitored closely by OB    Return if symptoms worsen or fail to improve. Shemar Gray, was evaluated through a synchronous (real-time) audio-video encounter. The patient (or guardian if applicable) is aware that this is a billable service, which includes applicable co-pays. This Virtual Visit was conducted with patient's (and/or legal guardian's) consent. The visit was conducted pursuant to the emergency declaration under the [facility] authority and the [employer] and [employer] Act. Patient identification was verified, and a caregiver was present when appropriate. The patient was located at Home: [address] [facility]  [address] [address]. Provider was located at [facility] (Appt Dept): [address]   [address].        Total time spent on this encounter: Not billed by time    --GEMA Ron CNP on 10/5/2022 at 3:56 PM    An electronic signature was used to authenticate this note.

## 2023-06-09 ENCOUNTER — HOSPITAL ENCOUNTER (OUTPATIENT)
Dept: WOMENS IMAGING | Age: 43
Discharge: HOME | End: 2023-06-09

## 2023-06-09 DIAGNOSIS — Z12.31 BREAST CANCER SCREENING BY MAMMOGRAM: ICD-10-CM

## 2023-10-17 NOTE — PROGRESS NOTES
normal.      Mouth/Throat:      Mouth: Mucous membranes are moist.      Pharynx: Oropharynx is clear. Eyes:      Pupils: Pupils are equal, round, and reactive to light. Neck:      Thyroid: No thyromegaly. Vascular: No carotid bruit or JVD. Trachea: No tracheal deviation. Cardiovascular:      Rate and Rhythm: Normal rate and regular rhythm. Heart sounds: Normal heart sounds. No murmur heard. No friction rub. Pulmonary:      Effort: Pulmonary effort is normal. No respiratory distress. Breath sounds: Normal breath sounds. No stridor. No wheezing, rhonchi or rales. Abdominal:      General: Abdomen is flat. Bowel sounds are normal. There is no distension. Palpations: Abdomen is soft. There is no mass. Tenderness: There is no abdominal tenderness. There is no guarding or rebound. Hernia: No hernia is present. Musculoskeletal:         General: No deformity. Cervical back: Neck supple. Right lower leg: No edema. Left lower leg: No edema. Lymphadenopathy:      Cervical: No cervical adenopathy. Skin:     General: Skin is warm and dry. Findings: No rash. Neurological:      General: No focal deficit present. Mental Status: She is alert and oriented to person, place, and time. Motor: No weakness. Gait: Gait normal.   Psychiatric:         Mood and Affect: Mood normal.         Behavior: Behavior normal.         Thought Content:  Thought content normal.         Judgment: Judgment normal.        Lab Results   Component Value Date    CHOL 195 03/07/2022    CHOL 199 02/02/2021    CHOL 184 02/25/2014     Lab Results   Component Value Date    TRIG 72 03/07/2022    TRIG 63 02/02/2021    TRIG 69 02/25/2014     Lab Results   Component Value Date    HDL 55 03/07/2022    HDL 67 (H) 02/02/2021    HDL 64 (H) 02/25/2014     Lab Results   Component Value Date    LDLCALC 126 (H) 03/07/2022    LDLCALC 119 (H) 02/02/2021    LDLCALC 106 (H) 02/25/2014     Lab

## 2023-10-18 ENCOUNTER — OFFICE VISIT (OUTPATIENT)
Dept: GYNECOLOGY | Age: 43
End: 2023-10-18
Payer: COMMERCIAL

## 2023-10-18 VITALS
SYSTOLIC BLOOD PRESSURE: 138 MMHG | WEIGHT: 178.6 LBS | DIASTOLIC BLOOD PRESSURE: 86 MMHG | BODY MASS INDEX: 32.87 KG/M2 | HEIGHT: 62 IN

## 2023-10-18 DIAGNOSIS — Z12.31 SCREENING MAMMOGRAM, ENCOUNTER FOR: ICD-10-CM

## 2023-10-18 DIAGNOSIS — Z01.419 WELL WOMAN EXAM WITH ROUTINE GYNECOLOGICAL EXAM: Primary | ICD-10-CM

## 2023-10-18 PROCEDURE — 99396 PREV VISIT EST AGE 40-64: CPT | Performed by: OBSTETRICS & GYNECOLOGY

## 2023-10-18 PROCEDURE — 3079F DIAST BP 80-89 MM HG: CPT | Performed by: OBSTETRICS & GYNECOLOGY

## 2023-10-18 PROCEDURE — 3075F SYST BP GE 130 - 139MM HG: CPT | Performed by: OBSTETRICS & GYNECOLOGY

## 2023-10-18 NOTE — PROGRESS NOTES
Subjective:      Patient ID: Chavez Rosales is a 39 y.o. female. HPI   pts here for annual gyn exam.  Stopped breast feeding 1 month ago. Denies gyn complaints. Review of Systems Pertinent review of systems items discussed above. All others systems items not discussed above were negative. Objective:   Physical Exam  Constitutional:       Appearance: She is well-developed. HENT:      Head: Normocephalic and atraumatic. Neck:      Thyroid: No thyromegaly. Trachea: No tracheal deviation. Cardiovascular:      Rate and Rhythm: Normal rate and regular rhythm. Heart sounds: Normal heart sounds. No murmur heard. Pulmonary:      Effort: Pulmonary effort is normal. No respiratory distress. Breath sounds: Normal breath sounds. No wheezing or rales. Chest:   Breasts:     Right: No mass, nipple discharge or skin change. Left: No mass, nipple discharge or skin change. Abdominal:      General: There is no distension. Palpations: Abdomen is soft. There is no mass. Tenderness: There is no abdominal tenderness. There is no rebound. Genitourinary:     Labia:         Right: No lesion. Left: No lesion. Vagina: Normal. No foreign body. No vaginal discharge. Cervix: No cervical motion tenderness, discharge or friability. Uterus: Not deviated, not enlarged, not fixed and not tender. Adnexa:         Right: No mass or tenderness. Left: No mass or tenderness. Rectum: Normal. No external hemorrhoid. Comments: Pap performed. Musculoskeletal:         General: Normal range of motion. Lymphadenopathy:      Cervical: No cervical adenopathy. Neurological:      Mental Status: She is alert and oriented to person, place, and time. eswab  Assessment:   Normal gyn exam     Plan:   Call with results.   Mammogram.  F/u annual gyn exam.       Barber Colunga MD

## 2023-10-19 ENCOUNTER — OFFICE VISIT (OUTPATIENT)
Dept: PRIMARY CARE CLINIC | Age: 43
End: 2023-10-19
Payer: COMMERCIAL

## 2023-10-19 VITALS
SYSTOLIC BLOOD PRESSURE: 132 MMHG | BODY MASS INDEX: 32.56 KG/M2 | TEMPERATURE: 97.4 F | OXYGEN SATURATION: 98 % | HEART RATE: 81 BPM | DIASTOLIC BLOOD PRESSURE: 88 MMHG | WEIGHT: 178.03 LBS

## 2023-10-19 DIAGNOSIS — L30.9 ECZEMA, UNSPECIFIED TYPE: ICD-10-CM

## 2023-10-19 DIAGNOSIS — R61 HYPERHIDROSIS: ICD-10-CM

## 2023-10-19 DIAGNOSIS — F41.8 TEST ANXIETY: ICD-10-CM

## 2023-10-19 DIAGNOSIS — K59.04 CHRONIC IDIOPATHIC CONSTIPATION: ICD-10-CM

## 2023-10-19 DIAGNOSIS — I10 HYPERTENSION, UNSPECIFIED TYPE: ICD-10-CM

## 2023-10-19 DIAGNOSIS — Z00.00 ANNUAL PHYSICAL EXAM: Primary | ICD-10-CM

## 2023-10-19 DIAGNOSIS — E66.9 CLASS 1 OBESITY WITH SERIOUS COMORBIDITY AND BODY MASS INDEX (BMI) OF 32.0 TO 32.9 IN ADULT, UNSPECIFIED OBESITY TYPE: ICD-10-CM

## 2023-10-19 DIAGNOSIS — A60.00 GENITAL HERPES SIMPLEX, UNSPECIFIED SITE: ICD-10-CM

## 2023-10-19 PROBLEM — E66.811 CLASS 1 OBESITY WITH SERIOUS COMORBIDITY AND BODY MASS INDEX (BMI) OF 32.0 TO 32.9 IN ADULT: Status: ACTIVE | Noted: 2023-10-19

## 2023-10-19 PROCEDURE — 3075F SYST BP GE 130 - 139MM HG: CPT | Performed by: NURSE PRACTITIONER

## 2023-10-19 PROCEDURE — 1036F TOBACCO NON-USER: CPT | Performed by: NURSE PRACTITIONER

## 2023-10-19 PROCEDURE — 99213 OFFICE O/P EST LOW 20 MIN: CPT | Performed by: NURSE PRACTITIONER

## 2023-10-19 PROCEDURE — 99395 PREV VISIT EST AGE 18-39: CPT | Performed by: NURSE PRACTITIONER

## 2023-10-19 PROCEDURE — G8427 DOCREV CUR MEDS BY ELIG CLIN: HCPCS | Performed by: NURSE PRACTITIONER

## 2023-10-19 PROCEDURE — G8484 FLU IMMUNIZE NO ADMIN: HCPCS | Performed by: NURSE PRACTITIONER

## 2023-10-19 PROCEDURE — G8417 CALC BMI ABV UP PARAM F/U: HCPCS | Performed by: NURSE PRACTITIONER

## 2023-10-19 PROCEDURE — 3079F DIAST BP 80-89 MM HG: CPT | Performed by: NURSE PRACTITIONER

## 2023-10-19 RX ORDER — ACYCLOVIR 800 MG/1
TABLET ORAL
Qty: 20 TABLET | Refills: 2 | Status: SHIPPED | OUTPATIENT
Start: 2023-10-19

## 2023-10-19 RX ORDER — LABETALOL 200 MG/1
TABLET, FILM COATED ORAL
COMMUNITY
Start: 2023-09-27 | End: 2023-10-19 | Stop reason: SINTOL

## 2023-10-19 RX ORDER — AMLODIPINE BESYLATE 10 MG/1
10 TABLET ORAL DAILY
Qty: 30 TABLET | Refills: 1 | Status: SHIPPED | OUTPATIENT
Start: 2023-10-19

## 2023-10-19 RX ORDER — TRIAMCINOLONE ACETONIDE 1 MG/G
OINTMENT TOPICAL
Qty: 80 G | Refills: 2 | Status: SHIPPED | OUTPATIENT
Start: 2023-10-19

## 2023-10-19 RX ORDER — BUSPIRONE HYDROCHLORIDE 5 MG/1
TABLET ORAL
Qty: 60 TABLET | Refills: 0 | Status: SHIPPED | OUTPATIENT
Start: 2023-10-19

## 2023-10-19 RX ORDER — FLUTICASONE PROPIONATE 50 MCG
SPRAY, SUSPENSION (ML) NASAL
Qty: 1 EACH | Refills: 3 | Status: SHIPPED | OUTPATIENT
Start: 2023-10-19

## 2023-10-19 SDOH — ECONOMIC STABILITY: HOUSING INSECURITY
IN THE LAST 12 MONTHS, WAS THERE A TIME WHEN YOU DID NOT HAVE A STEADY PLACE TO SLEEP OR SLEPT IN A SHELTER (INCLUDING NOW)?: NO

## 2023-10-19 SDOH — ECONOMIC STABILITY: FOOD INSECURITY: WITHIN THE PAST 12 MONTHS, THE FOOD YOU BOUGHT JUST DIDN'T LAST AND YOU DIDN'T HAVE MONEY TO GET MORE.: NEVER TRUE

## 2023-10-19 SDOH — ECONOMIC STABILITY: INCOME INSECURITY: HOW HARD IS IT FOR YOU TO PAY FOR THE VERY BASICS LIKE FOOD, HOUSING, MEDICAL CARE, AND HEATING?: NOT HARD AT ALL

## 2023-10-19 SDOH — ECONOMIC STABILITY: FOOD INSECURITY: WITHIN THE PAST 12 MONTHS, YOU WORRIED THAT YOUR FOOD WOULD RUN OUT BEFORE YOU GOT MONEY TO BUY MORE.: NEVER TRUE

## 2023-10-19 ASSESSMENT — PATIENT HEALTH QUESTIONNAIRE - PHQ9
2. FEELING DOWN, DEPRESSED OR HOPELESS: 0
SUM OF ALL RESPONSES TO PHQ QUESTIONS 1-9: 0
1. LITTLE INTEREST OR PLEASURE IN DOING THINGS: 0
SUM OF ALL RESPONSES TO PHQ9 QUESTIONS 1 & 2: 0
SUM OF ALL RESPONSES TO PHQ QUESTIONS 1-9: 0

## 2023-10-19 ASSESSMENT — ENCOUNTER SYMPTOMS
CONSTIPATION: 1
COUGH: 0
SORE THROAT: 0
ABDOMINAL PAIN: 0
SHORTNESS OF BREATH: 0
DIARRHEA: 0
NAUSEA: 0
WHEEZING: 0
VOMITING: 0
BACK PAIN: 0
CHEST TIGHTNESS: 0
BLOOD IN STOOL: 0

## 2023-10-20 LAB
C TRACH DNA SPEC QL NAA+PROBE: NOT DETECTED
CANDIDA RRNA VAG QL PROBE: NOT DETECTED
CANDIDA RRNA VAG QL PROBE: NOT DETECTED
CARBAPENEM RESISTANCE OXA-48 GENE BY PCR: NOT DETECTED
CEPHALOSPORIN RESISTANCE AMPC GENE: NOT DETECTED
ESBL RESISTANCE: NOT DETECTED
G VAGINALIS RRNA GENITAL QL PROBE: ABNORMAL
M HOMINIS DNA BLD QL NAA+PROBE: NOT DETECTED
MACROLIDE RESISTANCE: ABNORMAL
METHICILLIN RESISTANCE: ABNORMAL
MYCOPLASMA DNA SPEC QL NAA+PROBE: NOT DETECTED
N GONORRHOEA DNA SPEC QL NAA+PROBE: NOT DETECTED
OTHER MICROORG DNA SPEC QL NAA+PROBE: ABNORMAL
OTHER MICROORG DNA SPEC QL NAA+PROBE: DETECTED
QUINOLONE AND FLUOROQUINOLONE RESISTANCE: NOT DETECTED
T VAGINALIS RRNA SPEC QL NAA+PROBE: NOT DETECTED
TETRACYCLINE RESISTANCE: ABNORMAL
TRIMETHOPRIM/SULFONAMIDE RESISTANCE: ABNORMAL

## 2023-10-20 RX ORDER — METRONIDAZOLE 500 MG/1
500 TABLET ORAL 3 TIMES DAILY
Qty: 30 TABLET | Refills: 0 | Status: SHIPPED | OUTPATIENT
Start: 2023-10-20 | End: 2023-10-30

## 2023-10-20 RX ORDER — AZITHROMYCIN 500 MG/1
500 TABLET, FILM COATED ORAL DAILY
Qty: 7 TABLET | Refills: 0 | Status: SHIPPED | OUTPATIENT
Start: 2023-10-20 | End: 2023-10-27

## 2023-10-23 ENCOUNTER — PATIENT MESSAGE (OUTPATIENT)
Dept: GYNECOLOGY | Age: 43
End: 2023-10-23

## 2023-10-23 RX ORDER — CLINDAMYCIN HYDROCHLORIDE 300 MG/1
300 CAPSULE ORAL 2 TIMES DAILY
Qty: 14 CAPSULE | Refills: 0 | Status: SHIPPED | OUTPATIENT
Start: 2023-10-23 | End: 2023-10-30

## 2023-10-31 DIAGNOSIS — Z00.00 ANNUAL PHYSICAL EXAM: ICD-10-CM

## 2023-10-31 DIAGNOSIS — E66.9 CLASS 1 OBESITY WITH SERIOUS COMORBIDITY AND BODY MASS INDEX (BMI) OF 32.0 TO 32.9 IN ADULT, UNSPECIFIED OBESITY TYPE: ICD-10-CM

## 2023-10-31 DIAGNOSIS — I10 HYPERTENSION, UNSPECIFIED TYPE: ICD-10-CM

## 2023-10-31 LAB
ALBUMIN SERPL-MCNC: 4.4 G/DL (ref 3.4–5)
ALBUMIN/GLOB SERPL: 1.7 {RATIO} (ref 1.1–2.2)
ALP SERPL-CCNC: 107 U/L (ref 40–129)
ALT SERPL-CCNC: 12 U/L (ref 10–40)
ANION GAP SERPL CALCULATED.3IONS-SCNC: 12 MMOL/L (ref 3–16)
AST SERPL-CCNC: 15 U/L (ref 15–37)
BILIRUB SERPL-MCNC: 0.6 MG/DL (ref 0–1)
BUN SERPL-MCNC: 10 MG/DL (ref 7–20)
CALCIUM SERPL-MCNC: 9.2 MG/DL (ref 8.3–10.6)
CHLORIDE SERPL-SCNC: 104 MMOL/L (ref 99–110)
CHOLEST SERPL-MCNC: 182 MG/DL (ref 0–199)
CO2 SERPL-SCNC: 23 MMOL/L (ref 21–32)
CREAT SERPL-MCNC: 0.8 MG/DL (ref 0.6–1.1)
DEPRECATED RDW RBC AUTO: 13.6 % (ref 12.4–15.4)
GFR SERPLBLD CREATININE-BSD FMLA CKD-EPI: >60 ML/MIN/{1.73_M2}
GLUCOSE SERPL-MCNC: 88 MG/DL (ref 70–99)
HCT VFR BLD AUTO: 40.2 % (ref 36–48)
HDLC SERPL-MCNC: 48 MG/DL (ref 40–60)
HGB BLD-MCNC: 13.7 G/DL (ref 12–16)
LDLC SERPL CALC-MCNC: 122 MG/DL
MCH RBC QN AUTO: 33.9 PG (ref 26–34)
MCHC RBC AUTO-ENTMCNC: 34.1 G/DL (ref 31–36)
MCV RBC AUTO: 99.3 FL (ref 80–100)
PLATELET # BLD AUTO: 305 K/UL (ref 135–450)
PMV BLD AUTO: 9 FL (ref 5–10.5)
POTASSIUM SERPL-SCNC: 4.8 MMOL/L (ref 3.5–5.1)
PROT SERPL-MCNC: 7 G/DL (ref 6.4–8.2)
RBC # BLD AUTO: 4.05 M/UL (ref 4–5.2)
SODIUM SERPL-SCNC: 139 MMOL/L (ref 136–145)
TRIGL SERPL-MCNC: 58 MG/DL (ref 0–150)
TSH SERPL DL<=0.005 MIU/L-ACNC: 1.87 UIU/ML (ref 0.27–4.2)
VLDLC SERPL CALC-MCNC: 12 MG/DL
WBC # BLD AUTO: 7.8 K/UL (ref 4–11)

## 2023-11-14 ENCOUNTER — HOSPITAL ENCOUNTER (OUTPATIENT)
Dept: WOMENS IMAGING | Age: 43
Discharge: HOME | End: 2023-11-14
Payer: COMMERCIAL

## 2023-11-14 VITALS — HEIGHT: 61 IN | BODY MASS INDEX: 33.61 KG/M2 | WEIGHT: 178 LBS

## 2023-11-14 PROCEDURE — 77063 BREAST TOMOSYNTHESIS BI: CPT

## 2023-12-27 ENCOUNTER — TELEPHONE (OUTPATIENT)
Dept: PRIMARY CARE CLINIC | Age: 43
End: 2023-12-27

## 2023-12-27 NOTE — TELEPHONE ENCOUNTER
Pt dropped off physical form to be filled out and call pt when ready to be picked up.    Date of physical 10/19/23

## 2023-12-30 DIAGNOSIS — I10 HYPERTENSION, UNSPECIFIED TYPE: ICD-10-CM

## 2024-01-01 NOTE — LETTER
Neonatology Daily Progress Note    Ajay Abad is a 5 week old male infant  MRN: [id number]  Gestational Age: 32w6d  Birth weight: 2040 g (4 lb 8 oz)     DOL: 35 days    PMA: 37w6d    HOSPITAL PROBLEMS:  Principal Problem:    32 week prematurity (CMD)  Active Problems:    Slow feeding in     Facial bruising    Hyperbilirubinemia of prematurity  Resolved Problems:    * No resolved hospital problems. *    Ajay Abad is a 5 week old male born 32w6d admitted due to prematurity now working on PO feeding.    INTERVAL EVENTS SINCE PREVIOUS NOTE:  - Stable on RA  - no A/B events overnight  - taking 50% PO compared to 72% PO on   - Gained 35g (2825 g)  -Had circumcision performed on , was able to tolerate it well.     PHYSICAL EXAM    Vital signs:     Vital Last Value 24 Hour Range   Temperature 99 °F (37.2 °C) (24 0300) Temp  Min: 98.1 °F (36.7 °C)  Max: 99 °F (37.2 °C)   Pulse 156 (24 0700) Pulse  Min: 136  Max: 193   Respiratory 47 (24 07) Resp  Min: 24  Max: 62   Non-Invasive  Blood Pressure (!) 82/63 (24 2100) BP  Min: 74/57  Max: 82/63   Pulse Oximetry 100 % (24 07) SpO2  Min: 83 %  Max: 100 %   Arterial   Blood Pressure   No data recorded     General: Well appearing, no acute distress, responds to stimuli  HEENT: AFSOF, normocephalic, ears normally set, no cleft, palate intact, NG intact in L nare  Neck: supple, full range of motion  CV: RRR, no murmurs, 2+ pulses, good perfusion  Lungs: clear and equal bilaterally, no retractions, no nasal flaring  Abdomen: soft, non-tender, non-distended, no organomegaly  Extremities: negative O/B; FROM x 4  Neuro: good tone  Skin: no rash  : normal for GA +Circumcised ; anus patent  Back: no sathya, no dimples    Labs (Last 24 hours)  No results found for this or any previous visit (from the past 24 hour(s)).      ASSESSMENT AND PLAN BY SYSTEM       FLUID ELECTROLYTES NUTRITION     Weight Length Head circumference      Wt  [facility] END[facility]COPY  [address] [facility] [address]  Phone: [phone]        April 21, 2022     Patient: Removed for privacy   Date of Birth:    Date of Visit: 4/21/2022       To Whom it May Concern:    Please excuse Katie Maciel from work on 4/21/2022. He was responsible for transporting and being with a family member having a procedure on 4/21/2022. If you have any questions or concerns, please don't hesitate to call.     Sincerely,         Tay Langston RN   SDS services Readings from Last 2 Encounters:   24 2825 g (6 lb 3.7 oz) (<1%, Z= -3.48)*     * Growth percentiles are based on WHO (Boys, 0-2 years) data.      current - 18.5\" (47 cm) current - 33 cm (12.99\")   Weight change: 35 g (1.2 oz)          Dosing Weight: 2040 g (4 lb 8 oz)          No results found    POC GLUCOSE:  No results available in last 24 hours    Alkaline Phosphatase:  No results found       INPUT:             GIR:  mg/kg/min        % PO:  50%        OUTPUT:    Intake/Output         59    P.O. (mL/kg) 222 (78.58)     NG/GT (mL/kg) 218 (77.17)     Total Intake(mL/kg) 440 (155.75)     Urine (mL/kg/hr)      Stool (mL/kg/hr)      Total Output(mL/kg)      Net +440           Unmeasured Urine Occurrence 2 x            Assessment:    infant and slow feeding of     Plan:  -  ml/kg/kday  - MBM 55ml q3h PO/NG  - Neosure 22 kcal/oz BID feeds  - Polyvisol w Fe 1mL daily    CENTRAL LINE AND CATHETER DISCUSSION     Central Line  Type of Central Line:     Line Functioning appropriately: N/A  Necessity/Indication: No Central line in place  Continued need for Central Line discussed:  2024    Urinary Catheter  Necessity/Indication: N/A  Continued need for Urinary Catheter discussed: N/A    PAIN/SEDATION     NPASS Pain Score  Min: 0  Max: 0    Pain/Sedation Medications: None in use    Plan:   - continue to monitor N-PASS scores    BILIRUBIN     Assessment: Resolved Hyperbilirubinemia s/p Phototherapy    Baby's blood type: N/A   Maternal blood type  A Rh positive   Information for the patient's mother:  Kailyn Abad [[id number]]   No results foundAntibody:   Information for the patient's mother:  Kailyn Abad [[id number]]   No results found    Last Bilirubin:   No results found     Light level:     Double phototherapy 10/5 - 10/6  Single phototherapy 10/6 - 10/8    Plan:   - Check bilirubin PRN    APNEA/BRADYCARDIA/DESATURATION     Assessment: N/A    24H  Events:   No data found.  Last Significant Event: None  Medications: - No medications    Plan:   - Continue to monitor clinically    RESPIRATORY     Most recent blood gas:  VENOUS BLOOD GAS:  Lab Results   Component Value Date/Time    RVCPH 7.37 2024 02:47 AM    RVCPCO 37 2024 02:47 AM    RVCPO 43 (H) 2024 02:47 AM    RVCHCO 21 (L) 2024 02:47 AM    RVCBED -3 2024 02:47 AM     Mode: Room Air       Ventilator days:   Days on CPAP/HFNC:   Days on Oxygen:      Assessment: No active respiratory issues    Intubation  Necessity/Indication: Not Intubated  Readiness for Extubation discussed: N/A 2024    Plan:   - continue SpO2 monitoring    CARDIOVASCULAR     Assessment: No active cardiac issues     No valid procedures specified.     Plan:  - continue to monitor clinically    HEMATOLOGY     Assessment: At risk for anemia of prematurity    No results found      Transfusions:   PRBC: None    Platelet: None    FFP: None     Plan:  - Monitor clinically    INFECTIOUS DISEASE     Assessment: No concerns for infection at this time    No results found    Lab Results   Component Value Date/Time    BLC No Growth 5 Days. 2024 04:05 AM      - Blood culture and CBC on admission: Yes   - Initial antibiotic course: Ampicillin/Gentamicin x 36 hours pending Blood Cultures.     Plan:  - continue to monitor clinically     NEUROLOGY     Assessment: Premature infant at risk for IVH    Head Ultrasound Day of Life 4 (10/7/24):   Premature sonographic appearance of the brain. Tiny grade 1 germinal matrix  hemorrhages are subacute. Suggestion of a schuyler cisterna magna.    Head Ultrasound Day of Life 28 (10/31/24):   Within normal limits brain sonogram appearance for corrected gestational age.    Plan:   - continue to monitor clinically    OPHTHALMOLOGY     Assessment: At risk for Retinopathy of Prematurity    Awaiting first exam  Last Ophthalmologic exam:    Next Ophthalmologic exam due:      Plan:   - ROP  Exam as per unit policy @ 4-6 weeks of age    MEDICATIONS     Current Facility-Administered Medications   Medication    lidocaine HCl (PF) (XYLOCAINE) 1 % injection 10 mg    pediatric multivitamin with iron (POLY-VI-SOL WITH IRON) oral solution 1 mL        DISCHARGE TASKS       HEALTH MAINTENANCE AND DISCHARGE PLANNING     Immunizations:   Most Recent Immunizations   Administered Date(s) Administered    Hep B, adolescent or pediatric 2024      Hearing Test:      [place] ROP Eye Exam Needed?:   Yes    Car Seat Screen: Pass;MD notified (24 1120)    CCHD Screening:   Screening complete: Done (24 0300)  Right hand reading %: 100 % (Right wrist)  Foot reading %: 100 % (Right foot)  CHD: Normal    Circumcision: Done (24 1700)    [place] Screen- date drawn (most recent results):    Last 3 results:      Synagis Candidate:      Pediatrician: TBD    Discharge survey (once complete placed in folder in work room):     PARENTAL COMMUNICATION     Family present during rounds: No. Updated over the phone on     Gonzales Ag MD  PGY-1 Pediatrics    Patient was evaluated by Resident and me. I was present for key portions of the assessment. I have reviewed the EMR, pertinent physical exam findings, and multidisciplinary team recommendations. I have made appropriate addendums and agree with the assessment and plan as outlined in the above note. The medical decision making was ultimately made by me.        Meme Rodriguez MD  Attending Neonatologist  2024  8:53 AM

## 2024-01-02 RX ORDER — AMLODIPINE BESYLATE 10 MG/1
10 TABLET ORAL DAILY
Qty: 90 TABLET | Refills: 0 | Status: SHIPPED | OUTPATIENT
Start: 2024-01-02

## 2024-01-02 NOTE — TELEPHONE ENCOUNTER
Medication:   Requested Prescriptions     Pending Prescriptions Disp Refills    amLODIPine (NORVASC) 10 MG tablet [Pharmacy Med Name: amLODIPine BESYLATE 10 MG TAB] 30 tablet 0     Sig: TAKE 1 TABLET BY MOUTH DAILY     Last Filled:  10/19/2023    Last appt: 10/19/2023   Next appt: Visit date not found    Last OARRS:        No data to display

## 2024-01-12 NOTE — PROGRESS NOTES
Negative for chest pain, palpitations and leg swelling.   Gastrointestinal:  Positive for constipation (chronic). Negative for abdominal pain, blood in stool, diarrhea, nausea and vomiting.   Genitourinary:  Positive for menstrual problem (painful). Negative for difficulty urinating and dysuria.        History of genital herpes.  Rare outbreaks   Musculoskeletal:  Negative for arthralgias, back pain and neck pain.   Skin:  Negative for rash.   Neurological:  Negative for dizziness, tremors, numbness and headaches.   Psychiatric/Behavioral:  Positive for decreased concentration (When test taking). Negative for agitation, dysphoric mood and sleep disturbance. The patient is nervous/anxious (increased stress).       VITALS:  /88 (Site: Right Upper Arm, Position: Sitting, Cuff Size: Large Adult)   Pulse 86   Temp 97.6 °F (36.4 °C) (Oral)   Wt 81.4 kg (179 lb 6.4 oz)   LMP 01/11/2024   SpO2 99%   BMI 33.90 kg/m²      PE:  Physical Exam  Vitals and nursing note reviewed.   Constitutional:       General: She is not in acute distress.     Appearance: Normal appearance. She is well-developed and normal weight. She is not diaphoretic.   Neck:      Vascular: No carotid bruit.   Cardiovascular:      Rate and Rhythm: Normal rate and regular rhythm.      Heart sounds: Normal heart sounds. No murmur heard.     No friction rub.   Pulmonary:      Effort: Pulmonary effort is normal. No respiratory distress.      Breath sounds: Normal breath sounds. No stridor. No wheezing, rhonchi or rales.   Abdominal:      General: Abdomen is flat. There is no distension.      Palpations: Abdomen is soft.   Skin:     General: Skin is warm and dry.      Findings: No rash.   Neurological:      General: No focal deficit present.      Mental Status: She is alert and oriented to person, place, and time.      Motor: No weakness.      Gait: Gait normal.   Psychiatric:         Mood and Affect: Mood normal.         Behavior: Behavior normal.

## 2024-01-16 ENCOUNTER — OFFICE VISIT (OUTPATIENT)
Dept: PRIMARY CARE CLINIC | Age: 44
End: 2024-01-16
Payer: COMMERCIAL

## 2024-01-16 VITALS
WEIGHT: 179.4 LBS | OXYGEN SATURATION: 99 % | HEART RATE: 86 BPM | DIASTOLIC BLOOD PRESSURE: 88 MMHG | BODY MASS INDEX: 33.9 KG/M2 | TEMPERATURE: 97.6 F | SYSTOLIC BLOOD PRESSURE: 126 MMHG

## 2024-01-16 DIAGNOSIS — F41.8 TEST ANXIETY: ICD-10-CM

## 2024-01-16 DIAGNOSIS — I10 HYPERTENSION, UNSPECIFIED TYPE: Primary | ICD-10-CM

## 2024-01-16 DIAGNOSIS — Z02.1 ENCOUNTER FOR PRE-EMPLOYMENT HEALTH SCREENING EXAMINATION: ICD-10-CM

## 2024-01-16 PROCEDURE — 3074F SYST BP LT 130 MM HG: CPT | Performed by: NURSE PRACTITIONER

## 2024-01-16 PROCEDURE — 99214 OFFICE O/P EST MOD 30 MIN: CPT | Performed by: NURSE PRACTITIONER

## 2024-01-16 PROCEDURE — 3079F DIAST BP 80-89 MM HG: CPT | Performed by: NURSE PRACTITIONER

## 2024-01-16 RX ORDER — BUSPIRONE HYDROCHLORIDE 5 MG/1
TABLET ORAL
Qty: 60 TABLET | Refills: 2 | Status: SHIPPED | OUTPATIENT
Start: 2024-01-16

## 2024-01-16 ASSESSMENT — ENCOUNTER SYMPTOMS
CONSTIPATION: 1
SHORTNESS OF BREATH: 0
WHEEZING: 0
CHEST TIGHTNESS: 0
COUGH: 0
VOMITING: 0
DIARRHEA: 0
BLOOD IN STOOL: 0
ABDOMINAL PAIN: 0
BACK PAIN: 0
SORE THROAT: 0
NAUSEA: 0

## 2024-01-16 ASSESSMENT — PATIENT HEALTH QUESTIONNAIRE - PHQ9
SUM OF ALL RESPONSES TO PHQ QUESTIONS 1-9: 0
SUM OF ALL RESPONSES TO PHQ9 QUESTIONS 1 & 2: 0
SUM OF ALL RESPONSES TO PHQ QUESTIONS 1-9: 0
1. LITTLE INTEREST OR PLEASURE IN DOING THINGS: 0
2. FEELING DOWN, DEPRESSED OR HOPELESS: 0

## 2024-05-17 ENCOUNTER — TELEPHONE (OUTPATIENT)
Dept: PRIMARY CARE CLINIC | Age: 44
End: 2024-05-17

## 2024-05-17 DIAGNOSIS — I10 HYPERTENSION, UNSPECIFIED TYPE: ICD-10-CM

## 2024-05-17 RX ORDER — AMLODIPINE BESYLATE 10 MG/1
10 TABLET ORAL DAILY
Qty: 90 TABLET | Refills: 1 | Status: SHIPPED | OUTPATIENT
Start: 2024-05-17

## 2024-05-17 NOTE — TELEPHONE ENCOUNTER
Medication:   Requested Prescriptions     Pending Prescriptions Disp Refills    amLODIPine (NORVASC) 10 MG tablet 90 tablet 0     Sig: Take 1 tablet by mouth daily     Last Filled:  01/02/24    Last appt: 1/16/2024   Next appt: Visit date not found    Last OARRS:        No data to display

## 2024-05-17 NOTE — TELEPHONE ENCOUNTER
Pt called and said pharmacy faxed over refill request of pt's BP medication 2 days ago and they have not heard anything. Please review and contact pt/pharmacy when available.    amLODIPine (NORVASC) 10 MG tablet [[id number]]     [facility] [id number] - [place], [place] - [address] - P [phone] - F [phone]  [address] [address]  Phone: [phone]  Fax: [phone]

## 2024-05-17 NOTE — PROGRESS NOTES
ENCOUNTER DATE: 5/21/2024     NAME: Kandace Rao   AGE: 44 y.o.   GENDER: female   YOB: 1980    Chief Complaint   Patient presents with    Menstrual Problem     BEEN HAVING CYCLE EVERY 8 DAYS, HAIR LOSS, HOT FLASHES        ASSESSMENT/PLAN:  1. Menorrhagia with irregular cycle  Check labs today.  Proceed pending results  Check updated pelvic ultrasound.  Patient will schedule  Refer to new GYN per patient request.  Patient will call to schedule  Previous GYN notes reviewed  - CBC; Future  - TSH; Future  - Estradiol; Future  - Follicle Stimulating Hormone; Future  - Luteinizing Hormone; Future  - Jeffrey Betancur MD, Gynecology, [place]  - US NON OB TRANSVAGINAL; Future    2. Dyspareunia in female  - Jeffrey Betancur MD, Gynecology, [place]  - US NON OB TRANSVAGINAL; Future    3. Uterine leiomyoma, unspecified location  - Jeffrey Betancur MD, Gynecology, [place]  - US NON OB TRANSVAGINAL; Future    4. Endometriosis  - Jeffrey Betancur MD, Gynecology, [place]  - US NON OB TRANSVAGINAL; Future    5. Hypertension, unspecified type  Stable  - CBC; Future  - Comprehensive Metabolic Panel; Future      No follow-ups on file.     HPI:   Patient is here for a problem visit. Has several concerns    GYN  States she is having multiple cycles per month.   Usually 8-9 days apart and will last about 3-4 days.   Passing blood clots recently.   Having LLQ pain.   H/o fibroids and endometriosis.   Never had ablation. Was supposed to have a procedure in [place], but never scheduled.   No OCPs.   Painful intercourse.   Last US 2020.   Has a lot of hair loss, fatigue, nail changes, dry skin.       HYPERTENSION  On amlodipine 10mg daily  Didn't tolerate Labetalol   Was on lisinopril in the past  BP much better controlled.     ROS:  Review of Systems   Constitutional:  Positive for fatigue and unexpected weight change. Negative for activity change, appetite  [place]

## 2024-05-21 ENCOUNTER — OFFICE VISIT (OUTPATIENT)
Dept: PRIMARY CARE CLINIC | Age: 44
End: 2024-05-21
Payer: COMMERCIAL

## 2024-05-21 VITALS
OXYGEN SATURATION: 99 % | DIASTOLIC BLOOD PRESSURE: 82 MMHG | SYSTOLIC BLOOD PRESSURE: 126 MMHG | WEIGHT: 180 LBS | BODY MASS INDEX: 34.01 KG/M2 | HEART RATE: 68 BPM | TEMPERATURE: 97.6 F | RESPIRATION RATE: 16 BRPM

## 2024-05-21 DIAGNOSIS — I10 HYPERTENSION, UNSPECIFIED TYPE: ICD-10-CM

## 2024-05-21 DIAGNOSIS — N80.9 ENDOMETRIOSIS: ICD-10-CM

## 2024-05-21 DIAGNOSIS — N94.10 DYSPAREUNIA IN FEMALE: ICD-10-CM

## 2024-05-21 DIAGNOSIS — N92.1 MENORRHAGIA WITH IRREGULAR CYCLE: Primary | ICD-10-CM

## 2024-05-21 DIAGNOSIS — N92.1 MENORRHAGIA WITH IRREGULAR CYCLE: ICD-10-CM

## 2024-05-21 DIAGNOSIS — D25.9 UTERINE LEIOMYOMA, UNSPECIFIED LOCATION: ICD-10-CM

## 2024-05-21 LAB
ALBUMIN SERPL-MCNC: 4.2 G/DL (ref 3.4–5)
ALBUMIN/GLOB SERPL: 1.3 {RATIO} (ref 1.1–2.2)
ALP SERPL-CCNC: 113 U/L (ref 40–129)
ALT SERPL-CCNC: 11 U/L (ref 10–40)
ANION GAP SERPL CALCULATED.3IONS-SCNC: 12 MMOL/L (ref 3–16)
AST SERPL-CCNC: 13 U/L (ref 15–37)
BILIRUB SERPL-MCNC: 0.6 MG/DL (ref 0–1)
BUN SERPL-MCNC: 7 MG/DL (ref 7–20)
CALCIUM SERPL-MCNC: 9.3 MG/DL (ref 8.3–10.6)
CHLORIDE SERPL-SCNC: 104 MMOL/L (ref 99–110)
CO2 SERPL-SCNC: 23 MMOL/L (ref 21–32)
CREAT SERPL-MCNC: 0.5 MG/DL (ref 0.6–1.1)
DEPRECATED RDW RBC AUTO: 13.3 % (ref 12.4–15.4)
ESTRADIOL SERPL-MCNC: 22 PG/ML
FSH SERPL-ACNC: 15.2 MIU/ML
GFR SERPLBLD CREATININE-BSD FMLA CKD-EPI: >90 ML/MIN/{1.73_M2}
GLUCOSE SERPL-MCNC: 94 MG/DL (ref 70–99)
HCT VFR BLD AUTO: 38.6 % (ref 36–48)
HGB BLD-MCNC: 13.3 G/DL (ref 12–16)
LH SERPL-ACNC: 6 MIU/ML
MCH RBC QN AUTO: 34 PG (ref 26–34)
MCHC RBC AUTO-ENTMCNC: 34.5 G/DL (ref 31–36)
MCV RBC AUTO: 98.5 FL (ref 80–100)
PLATELET # BLD AUTO: 319 K/UL (ref 135–450)
PMV BLD AUTO: 8.3 FL (ref 5–10.5)
POTASSIUM SERPL-SCNC: 4.4 MMOL/L (ref 3.5–5.1)
PROT SERPL-MCNC: 7.4 G/DL (ref 6.4–8.2)
RBC # BLD AUTO: 3.92 M/UL (ref 4–5.2)
SODIUM SERPL-SCNC: 139 MMOL/L (ref 136–145)
TSH SERPL DL<=0.005 MIU/L-ACNC: 0.7 UIU/ML (ref 0.27–4.2)
WBC # BLD AUTO: 8.5 K/UL (ref 4–11)

## 2024-05-21 PROCEDURE — 3079F DIAST BP 80-89 MM HG: CPT | Performed by: NURSE PRACTITIONER

## 2024-05-21 PROCEDURE — 3074F SYST BP LT 130 MM HG: CPT | Performed by: NURSE PRACTITIONER

## 2024-05-21 PROCEDURE — 99214 OFFICE O/P EST MOD 30 MIN: CPT | Performed by: NURSE PRACTITIONER

## 2024-05-21 ASSESSMENT — ENCOUNTER SYMPTOMS
SORE THROAT: 0
COUGH: 0
WHEEZING: 0
DIARRHEA: 0
CONSTIPATION: 1
NAUSEA: 0
ABDOMINAL PAIN: 0
CHEST TIGHTNESS: 0
BACK PAIN: 0
VOMITING: 0
BLOOD IN STOOL: 0
SHORTNESS OF BREATH: 0

## 2024-05-26 ENCOUNTER — HOSPITAL ENCOUNTER (OUTPATIENT)
Dept: ULTRASOUND IMAGING | Age: 44
Discharge: HOME | End: 2024-05-26
Payer: COMMERCIAL

## 2024-05-26 DIAGNOSIS — N94.10 DYSPAREUNIA IN FEMALE: ICD-10-CM

## 2024-05-26 DIAGNOSIS — N92.1 MENORRHAGIA WITH IRREGULAR CYCLE: ICD-10-CM

## 2024-05-26 DIAGNOSIS — N80.9 ENDOMETRIOSIS: ICD-10-CM

## 2024-05-26 DIAGNOSIS — D25.9 UTERINE LEIOMYOMA, UNSPECIFIED LOCATION: ICD-10-CM

## 2024-05-26 PROCEDURE — 76830 TRANSVAGINAL US NON-OB: CPT

## 2024-05-26 PROCEDURE — 76856 US EXAM PELVIC COMPLETE: CPT

## 2024-05-31 ENCOUNTER — OFFICE VISIT (OUTPATIENT)
Dept: GYNECOLOGY | Age: 44
End: 2024-05-31
Payer: COMMERCIAL

## 2024-05-31 VITALS
WEIGHT: 182 LBS | BODY MASS INDEX: 34.39 KG/M2 | SYSTOLIC BLOOD PRESSURE: 124 MMHG | DIASTOLIC BLOOD PRESSURE: 82 MMHG | HEART RATE: 105 BPM | OXYGEN SATURATION: 97 %

## 2024-05-31 DIAGNOSIS — R10.2 PELVIC PAIN: ICD-10-CM

## 2024-05-31 DIAGNOSIS — N92.0 MENORRHAGIA WITH REGULAR CYCLE: Primary | ICD-10-CM

## 2024-05-31 DIAGNOSIS — N94.6 DYSMENORRHEA: ICD-10-CM

## 2024-05-31 DIAGNOSIS — D21.9 FIBROID: ICD-10-CM

## 2024-05-31 PROCEDURE — 3079F DIAST BP 80-89 MM HG: CPT | Performed by: OBSTETRICS & GYNECOLOGY

## 2024-05-31 PROCEDURE — 99204 OFFICE O/P NEW MOD 45 MIN: CPT | Performed by: OBSTETRICS & GYNECOLOGY

## 2024-05-31 PROCEDURE — 3074F SYST BP LT 130 MM HG: CPT | Performed by: OBSTETRICS & GYNECOLOGY

## 2024-05-31 RX ORDER — MEDROXYPROGESTERONE ACETATE 10 MG/1
20 TABLET ORAL DAILY
Qty: 60 TABLET | Refills: 1 | Status: SHIPPED | OUTPATIENT
Start: 2024-05-31

## 2024-05-31 NOTE — PROGRESS NOTES
Risks and benefits reviewed.  Consent obtained.  Provera 20 mg daily.    Discussed management options of her uterine fibroids.  We discussed surgical management as well as risks taking to account the patient's significant past GYN surgical history.  Alternative management options including uterine artery embolization were reviewed and discussed with the patient.  Patient is given a consult to discuss with interventional radiology to see if she is a candidate for this.  We discussed possible need for concurrent or sequential therapy for symptom management and she voiced understanding.    Testing: See orders    Medications: Provera  Risks & side effects, benefits, and alternatives of medications were discussed. Indications for medications are outlined in diagnoses. Benefits outweigh potential risk. Medications are prescribed as part of an overall monitoring / treatment plan with regular, scheduled follow up. The above medication counseling has been and will continue to be discussed with the patient, who agrees with the plan and voices understanding.    Procedures / Referrals: Follow-up for endometrial sampling, see above.  Consult interventional radiology    Management options, where appropriate, were discussed.  Diagnoses were discussed in detail; patient voiced understanding.   Future direction:  If she does not improve with current management, further work-up or treatment may be necessary.  Warnings and instructions were given.  Her questions were answered.  Kandace voices understanding.     The level of care, extent of history, examination, laboratory review, imaging review, and complexity of care is based on the patient receiving specialty services.  The extent and elements of her examination are indicated and appropriate in management. Image review, when appropriate, is done directly with the patient and her support person/family when present.  This service is being reported inclusive to the evaluation and

## 2024-06-04 ENCOUNTER — PROCEDURE VISIT (OUTPATIENT)
Dept: GYNECOLOGY | Age: 44
End: 2024-06-04
Payer: COMMERCIAL

## 2024-06-04 ENCOUNTER — TELEPHONE (OUTPATIENT)
Dept: GYNECOLOGY | Age: 44
End: 2024-06-04

## 2024-06-04 VITALS
WEIGHT: 180 LBS | DIASTOLIC BLOOD PRESSURE: 82 MMHG | HEART RATE: 101 BPM | SYSTOLIC BLOOD PRESSURE: 124 MMHG | OXYGEN SATURATION: 96 % | BODY MASS INDEX: 34.01 KG/M2

## 2024-06-04 DIAGNOSIS — R10.2 PELVIC PAIN: ICD-10-CM

## 2024-06-04 DIAGNOSIS — D21.9 FIBROID: ICD-10-CM

## 2024-06-04 DIAGNOSIS — N92.0 MENORRHAGIA WITH REGULAR CYCLE: Primary | ICD-10-CM

## 2024-06-04 DIAGNOSIS — N94.6 DYSMENORRHEA: ICD-10-CM

## 2024-06-04 PROCEDURE — 58100 BIOPSY OF UTERUS LINING: CPT | Performed by: OBSTETRICS & GYNECOLOGY

## 2024-06-04 NOTE — TELEPHONE ENCOUNTER
Pt expresses that she had been on the phone trying to figure out where to send her but she was finally able to get to the right people.    Reviewed with patient on admission

## 2024-06-04 NOTE — PROGRESS NOTES
See previous notes.  Patient is here to follow-up regarding further evaluation of endometrium with endometrial sampling.    The patient is previously been referred to interventional radiology for evaluation and consideration of uterine artery embolization treatment.  She has yet to make her appointment.    Kandace Rao is advised to proceed with endometrial biopsy for further evaluation of her AUB/menometorrhagia.    She is educated/made aware of the risks of procedure including, but not limited to bleeding, infection, uterine perforation, damage to internal organs, and need for surgery.  She was also informed that the specimen is sent to the pathologist and as aware of the risks including failure to diagnose, as well as significant pathology be identified at that time requiring additional evaluation and treatment.  She accepts these things in exchange for the procedure and wishes to proceed.  Consent is obtained.    Procedure Note:  The patient is placed in a supine position in stirrups.  A speculum was gently placed into the vagina.  The cervix is visualized.  The cervix is cleaned with Betadine.  An Allis clamp was used on the anterior lip of the cervix for stabilization and to draw the uterine cervix down in the vaginal canal: YES   Gentle uterine dilation with Carlitos uterine dilators was performed:  NO   Suction Pipelle device was advanced through the cervix and endocervical canal into the endometrial cavity.    Endometrial cavity sounds to: 9 cm  Suction was applied and sampling of the endometrial cavity was performed in the 360 degree fashion.  Number of passes performed: 1   Quantity of tissue obtained: Scant   The procedure was concluded.  All instruments were removed from vagina including speculum.  The procedure was well tolerated and no complications were experienced.  Specimen sent to pathology in formalin.

## 2024-06-05 NOTE — PROGRESS NOTES
The sensitive parts of the examination were performed with Gopi Chakraborty MA as a chaperone.  Gopi was present during the entirety of the sensitive parts of the examination.

## 2024-06-18 DIAGNOSIS — B00.1 COLD SORE: ICD-10-CM

## 2024-06-18 DIAGNOSIS — D25.9 UTERINE LEIOMYOMA, UNSPECIFIED LOCATION: Primary | ICD-10-CM

## 2024-06-18 DIAGNOSIS — L30.9 ECZEMA, UNSPECIFIED TYPE: ICD-10-CM

## 2024-06-18 NOTE — TELEPHONE ENCOUNTER
Recent Visits  Date Type Provider Dept   05/21/24 Office Visit Jennifer Vivas, GEMA - CNP [facility]   01/16/24 Office Visit Jennifer Vivas, APRN - CNP [facility]   10/19/23 Office Visit Jennifer Vivas, APRN - CNP [facility]   Showing recent visits within past 540 days with a meds authorizing provider and meeting all other requirements  Future Appointments  No visits were found meeting these conditions.  Showing future appointments within next 150 days with a meds authorizing provider and meeting all other requirements     10/19/2021

## 2024-06-19 DIAGNOSIS — D25.9 UTERINE LEIOMYOMA, UNSPECIFIED LOCATION: Primary | ICD-10-CM

## 2024-06-19 DIAGNOSIS — N92.1 MENORRHAGIA WITH IRREGULAR CYCLE: ICD-10-CM

## 2024-06-19 RX ORDER — ACYCLOVIR 50 MG/G
OINTMENT TOPICAL
Qty: 30 G | Refills: 1 | Status: SHIPPED | OUTPATIENT
Start: 2024-06-19

## 2024-06-19 RX ORDER — TRIAMCINOLONE ACETONIDE 1 MG/G
OINTMENT TOPICAL
Qty: 80 G | Refills: 2 | Status: SHIPPED | OUTPATIENT
Start: 2024-06-19

## 2024-06-19 NOTE — TELEPHONE ENCOUNTER
Medication:   Requested Prescriptions     Pending Prescriptions Disp Refills    triamcinolone (KENALOG) 0.1 % ointment 80 g 2     Sig: Apply topically 2 times daily.     Last Filled:  10.19.23    Last appt: 5/21/2024   Next appt: Visit date not found    Last OARRS:        No data to display

## 2024-06-20 ENCOUNTER — TELEPHONE (OUTPATIENT)
Dept: ADMINISTRATIVE | Age: 44
End: 2024-06-20

## 2024-06-20 NOTE — TELEPHONE ENCOUNTER
Submitted PA for Acyclovir 5% ointment   Via CM (Key: [id number])  STATUS: PENDING.    Follow up done daily; if no decision with in three days we will refax.  If another three days goes by with no decision will call the insurance for status.

## 2024-07-02 ENCOUNTER — OFFICE VISIT (OUTPATIENT)
Dept: PRIMARY CARE CLINIC | Age: 44
End: 2024-07-02
Payer: COMMERCIAL

## 2024-07-02 VITALS
WEIGHT: 182 LBS | HEART RATE: 88 BPM | TEMPERATURE: 97.7 F | SYSTOLIC BLOOD PRESSURE: 126 MMHG | RESPIRATION RATE: 16 BRPM | DIASTOLIC BLOOD PRESSURE: 88 MMHG | BODY MASS INDEX: 34.36 KG/M2 | OXYGEN SATURATION: 99 % | HEIGHT: 61 IN

## 2024-07-02 DIAGNOSIS — M54.32 SCIATICA OF LEFT SIDE: Primary | ICD-10-CM

## 2024-07-02 DIAGNOSIS — M99.05 SOMATIC DYSFUNCTION OF PELVIS REGION: ICD-10-CM

## 2024-07-02 DIAGNOSIS — M79.18 GLUTEAL PAIN: ICD-10-CM

## 2024-07-02 DIAGNOSIS — M25.552 PAIN OF LEFT HIP: ICD-10-CM

## 2024-07-02 PROCEDURE — 98925 OSTEOPATH MANJ 1-2 REGIONS: CPT | Performed by: STUDENT IN AN ORGANIZED HEALTH CARE EDUCATION/TRAINING PROGRAM

## 2024-07-02 PROCEDURE — 3074F SYST BP LT 130 MM HG: CPT | Performed by: STUDENT IN AN ORGANIZED HEALTH CARE EDUCATION/TRAINING PROGRAM

## 2024-07-02 PROCEDURE — 3079F DIAST BP 80-89 MM HG: CPT | Performed by: STUDENT IN AN ORGANIZED HEALTH CARE EDUCATION/TRAINING PROGRAM

## 2024-07-02 PROCEDURE — 99203 OFFICE O/P NEW LOW 30 MIN: CPT | Performed by: STUDENT IN AN ORGANIZED HEALTH CARE EDUCATION/TRAINING PROGRAM

## 2024-07-02 RX ORDER — METHOCARBAMOL 750 MG/1
750 TABLET, FILM COATED ORAL 4 TIMES DAILY
Qty: 40 TABLET | Refills: 0 | Status: SHIPPED | OUTPATIENT
Start: 2024-07-02 | End: 2024-07-12

## 2024-07-02 RX ORDER — METHOCARBAMOL 750 MG/1
750 TABLET, FILM COATED ORAL 4 TIMES DAILY
Qty: 40 TABLET | Refills: 0 | Status: SHIPPED | OUTPATIENT
Start: 2024-07-02 | End: 2024-07-02

## 2024-07-02 SDOH — ECONOMIC STABILITY: INCOME INSECURITY: HOW HARD IS IT FOR YOU TO PAY FOR THE VERY BASICS LIKE FOOD, HOUSING, MEDICAL CARE, AND HEATING?: NOT VERY HARD

## 2024-07-02 SDOH — ECONOMIC STABILITY: FOOD INSECURITY: WITHIN THE PAST 12 MONTHS, THE FOOD YOU BOUGHT JUST DIDN'T LAST AND YOU DIDN'T HAVE MONEY TO GET MORE.: NEVER TRUE

## 2024-07-02 SDOH — ECONOMIC STABILITY: FOOD INSECURITY: WITHIN THE PAST 12 MONTHS, YOU WORRIED THAT YOUR FOOD WOULD RUN OUT BEFORE YOU GOT MONEY TO BUY MORE.: NEVER TRUE

## 2024-07-02 ASSESSMENT — PATIENT HEALTH QUESTIONNAIRE - PHQ9
6. FEELING BAD ABOUT YOURSELF - OR THAT YOU ARE A FAILURE OR HAVE LET YOURSELF OR YOUR FAMILY DOWN: NOT AT ALL
8. MOVING OR SPEAKING SO SLOWLY THAT OTHER PEOPLE COULD HAVE NOTICED. OR THE OPPOSITE, BEING SO FIGETY OR RESTLESS THAT YOU HAVE BEEN MOVING AROUND A LOT MORE THAN USUAL: NOT AT ALL
7. TROUBLE CONCENTRATING ON THINGS, SUCH AS READING THE NEWSPAPER OR WATCHING TELEVISION: NOT AT ALL
3. TROUBLE FALLING OR STAYING ASLEEP: NOT AT ALL
SUM OF ALL RESPONSES TO PHQ QUESTIONS 1-9: 0
9. THOUGHTS THAT YOU WOULD BE BETTER OFF DEAD, OR OF HURTING YOURSELF: NOT AT ALL
5. POOR APPETITE OR OVEREATING: NOT AT ALL
2. FEELING DOWN, DEPRESSED OR HOPELESS: NOT AT ALL
4. FEELING TIRED OR HAVING LITTLE ENERGY: NOT AT ALL
10. IF YOU CHECKED OFF ANY PROBLEMS, HOW DIFFICULT HAVE THESE PROBLEMS MADE IT FOR YOU TO DO YOUR WORK, TAKE CARE OF THINGS AT HOME, OR GET ALONG WITH OTHER PEOPLE: NOT DIFFICULT AT ALL
1. LITTLE INTEREST OR PLEASURE IN DOING THINGS: NOT AT ALL
SUM OF ALL RESPONSES TO PHQ QUESTIONS 1-9: 0
SUM OF ALL RESPONSES TO PHQ9 QUESTIONS 1 & 2: 0
SUM OF ALL RESPONSES TO PHQ QUESTIONS 1-9: 0
SUM OF ALL RESPONSES TO PHQ QUESTIONS 1-9: 0

## 2024-07-02 ASSESSMENT — ANXIETY QUESTIONNAIRES
GAD7 TOTAL SCORE: 0
6. BECOMING EASILY ANNOYED OR IRRITABLE: NOT AT ALL
2. NOT BEING ABLE TO STOP OR CONTROL WORRYING: NOT AT ALL
4. TROUBLE RELAXING: NOT AT ALL
7. FEELING AFRAID AS IF SOMETHING AWFUL MIGHT HAPPEN: NOT AT ALL
IF YOU CHECKED OFF ANY PROBLEMS ON THIS QUESTIONNAIRE, HOW DIFFICULT HAVE THESE PROBLEMS MADE IT FOR YOU TO DO YOUR WORK, TAKE CARE OF THINGS AT HOME, OR GET ALONG WITH OTHER PEOPLE: NOT DIFFICULT AT ALL
5. BEING SO RESTLESS THAT IT IS HARD TO SIT STILL: NOT AT ALL
3. WORRYING TOO MUCH ABOUT DIFFERENT THINGS: NOT AT ALL
1. FEELING NERVOUS, ANXIOUS, OR ON EDGE: NOT AT ALL

## 2024-07-02 NOTE — PROGRESS NOTES
[facility]  [address]  [address]  [facility] [address]  Dept: [phone]  Dept Fax: [phone]  Loc: [phone]      Kandace Rao is a 44 y.o. female who presents today for:  Chief Complaint   Patient presents with    Pain    Numbness    Tingling     Left leg       HPI:   Kandace Rao is 44 y.o. presents today for acute visit.     Pain in left ASIS initially. Sometimes having left lower back pain, radiating down left leg. Worsening over the last 3 weeks. Now having weakness in left leg. Doing low back exercises twice daily, warm/cold compresses. Has numbness and tingling down posterior leg to lateral malleolus. Had knee cap pain and tingling three days ago. No saddle anesthesia.   Pain worst with walking or prolonged standing  Pain is worse with turning over     Doing pilates for the last 3 months.   Has a 1 year old     Objective:     Vitals:    07/02/24 1459   BP: 126/88   Pulse: 88   Resp: 16   Temp: 97.7 °F (36.5 °C)   SpO2: 99%         Wt Readings from Last 3 Encounters:   07/02/24 82.6 kg (182 lb)   06/04/24 81.6 kg (180 lb)   05/31/24 82.6 kg (182 lb)       BP Readings from Last 3 Encounters:   07/02/24 126/88   06/04/24 124/82   05/31/24 124/82       Lab Results   Component Value Date    WBC 8.5 05/21/2024    HGB 13.3 05/21/2024    HCT 38.6 05/21/2024    MCV 98.5 05/21/2024     05/21/2024     Lab Results   Component Value Date     05/21/2024    K 4.4 05/21/2024     05/21/2024    CO2 23 05/21/2024    BUN 7 05/21/2024    CREATININE 0.5 (L) 05/21/2024    GLUCOSE 94 05/21/2024    CALCIUM 9.3 05/21/2024    BILITOT 0.6 05/21/2024    ALKPHOS 113 05/21/2024    AST 13 (L) 05/21/2024    ALT 11 05/21/2024    LABGLOM >90 05/21/2024    GFRAA >60 03/07/2022    AGRATIO 1.3 05/21/2024    GLOB 3.5 03/02/2018     Lab Results   Component Value Date    TSH 0.70 05/21/2024    TSHFT4 0.82 02/02/2021     Lab Results   Component Value Date    LABA1C

## 2024-07-15 ENCOUNTER — HOSPITAL ENCOUNTER (OUTPATIENT)
Dept: MRI IMAGING | Age: 44
Discharge: HOME | End: 2024-07-15
Payer: COMMERCIAL

## 2024-07-15 DIAGNOSIS — N92.1 MENORRHAGIA WITH IRREGULAR CYCLE: ICD-10-CM

## 2024-07-15 DIAGNOSIS — D25.9 UTERINE LEIOMYOMA, UNSPECIFIED LOCATION: ICD-10-CM

## 2024-07-15 PROCEDURE — 72197 MRI PELVIS W/O & W/DYE: CPT

## 2024-07-15 PROCEDURE — A9576 INJ PROHANCE MULTIPACK: HCPCS

## 2024-07-15 PROCEDURE — 6360000004 HC RX CONTRAST MEDICATION

## 2024-07-15 RX ADMIN — GADOTERIDOL 16 ML: 279.3 INJECTION, SOLUTION INTRAVENOUS at 07:53

## 2024-07-17 ENCOUNTER — HOSPITAL ENCOUNTER (OUTPATIENT)
Dept: PHYSICAL THERAPY | Age: 44
Setting detail: THERAPIES SERIES
Discharge: HOME | End: 2024-07-17
Payer: COMMERCIAL

## 2024-07-17 DIAGNOSIS — M54.16 LUMBAR RADICULOPATHY: Primary | ICD-10-CM

## 2024-07-17 PROCEDURE — 97161 PT EVAL LOW COMPLEX 20 MIN: CPT | Performed by: PHYSICAL THERAPIST

## 2024-07-17 PROCEDURE — 97110 THERAPEUTIC EXERCISES: CPT | Performed by: PHYSICAL THERAPIST

## 2024-07-17 NOTE — PLAN OF CARE
[facility] [address], [address], [place] [address] office: [phone] fax: [phone]     Physical Therapy Initial Evaluation Certification      Dear Purvi Robert DO,    We had the pleasure of evaluating the following patient for physical therapy services at [facility].  A summary of our findings can be found in the initial assessment below.  This includes our plan of care.  If you have any questions or concerns regarding these findings, please do not hesitate to contact me at the office phone number listed above.  Thank you for the referral.     Physician Signature:_______________________________Date:__________________  By signing above (or electronic signature), therapist’s plan is approved by physician       Physical Therapy: TREATMENT/PROGRESS NOTE   Patient: Kandace Rao (44 y.o. female)   Examination Date: 2024   :  1980 MRN: [id number]   Visit #: 1 Ded not met  Insurance Allowable Auth Needed   25 []Yes    [x]No    Insurance: Payor: UMR / Plan: UMR / Product Type: *No Product type* /   Insurance ID: [id number] - (Commercial)  Secondary Insurance (if applicable): [facility] MEDICAID   Treatment Diagnosis:     ICD-10-CM    1. Lumbar radiculopathy  M54.16          Medical Diagnosis:  Sciatica of left side [M54.32]   Referring Physician: Purvi Robert DO  PCP: Jennifer Vivas, APRN - CNP     Plan of care signed (Y/N):     Date of Patient follow up with Physician:      Progress Report/POC: EVAL today  POC update due: (10 visits /OR AUTH LIMITS, whichever is less)  2024                                             Precautions/ Contra-indications:           Latex allergy:  YES  Pacemaker:    NO  Contraindications for Manipulation: None  Date of Surgery: NA  Other:    Red Flags:  None    C-SSRS Triggered by Intake questionnaire:   Patient answered 'NO' to both behavioral questions on

## 2024-07-19 ENCOUNTER — OFFICE VISIT (OUTPATIENT)
Dept: BARIATRICS/WEIGHT MGMT | Age: 44
End: 2024-07-19

## 2024-07-19 ENCOUNTER — TELEPHONE (OUTPATIENT)
Dept: PRIMARY CARE CLINIC | Age: 44
End: 2024-07-19

## 2024-07-19 VITALS
BODY MASS INDEX: 35.26 KG/M2 | DIASTOLIC BLOOD PRESSURE: 83 MMHG | HEIGHT: 60 IN | SYSTOLIC BLOOD PRESSURE: 116 MMHG | WEIGHT: 179.6 LBS

## 2024-07-19 DIAGNOSIS — E66.9 OBESITY (BMI 30.0-34.9): Primary | ICD-10-CM

## 2024-07-19 RX ORDER — AMOXICILLIN 250 MG
1 CAPSULE ORAL DAILY
COMMUNITY

## 2024-07-19 NOTE — TELEPHONE ENCOUNTER
Left vm for pt to return call  
I would recommend to schedule an apt next wk with me since she has not had any improvement. Will likely need further imaging, referrals. I have not seen her for this, so will need apt.  I looked at her MRI, but it was not of the spine/hip, only pelvic organs, so doesn't tell us anything about her hip/leg issues.   If the OMT that Dr Robert performed helped, she is welcome to schedule again with Dr Robert for that. I believe she likes 30 min for OMT apts.   
Patient is requesting a call back from either Jennifer or Suzette. She would like to go over the assessment and plan that Dr. Robert started for her after her last visit on 07/02/24  
Pt called in wanting to give an update on her leg pain. Pt said it is not getting any better her leg still goes numb on her to the point where she can't walk. Pt did have a MRI on 7/15/24 so is wanting you to look a look at it as well to see if you have any other recommendations as to what to do besides doing physical therapy. Pt is willing to make an appointment if you want her to to discuss this further. Please Advise  
no

## 2024-07-19 NOTE — PROGRESS NOTES
Kandace Rao is a 44 y.o. female with a date of birth of 1980.    Vitals:    07/19/24 1239   BP: 116/83   Site: Left Upper Arm   Position: Sitting   Weight: 81.5 kg (179 lb 9.6 oz)   Height: 1.53 m (5' 0.25\")    BMI: Body mass index is 34.79 kg/m². Obesity Classification: Class III    Weight History:   Wt Readings from Last 3 Encounters:   07/19/24 81.5 kg (179 lb 9.6 oz)   07/02/24 82.6 kg (182 lb)   06/04/24 81.6 kg (180 lb)       Pt attended Medical Weight Management Seminar. Patient was educated on low-carb diet protocol. Nutrition and habit guidelines were discussed and written information was provided. Bariatric Nutrition Questionnaire completed during class and scanned into media.       Goals  Weight: 130-135#  Health Improvement: lessen joint/back pain, have more energy, get off BP meds    Assessment  Nutritional Needs: RMR=(9.99 x 81.5 kg) + (6.25 x 155 cm) - (4.92 x 44 y.o.) -161 = 1400 kcal x 1.3 (sedentary activity factor)= 1820 kcal - 500 (for 1 lb weight loss/week)= 1320 kcal.    Patient has participated in the following weight loss programs: Cabbage Soup Diet,  and Fasting .   Patient has participated in meal replacement/liquid diets. - juicing, slimfast  Patient has not participated in weight loss medications.  Patient does not have history of bariatric surgery.     Pt does not eat Pork or Cashews    Plan  Plan/Recommendations: Start 1200 Kcal LCMP  Optifast:  Pt not interested.   Diet Medications:  Pt interested in.   Bariatric Surgery:  Pt not interested in.   1:1 RD Visit:  Pt interested in.     PES Statement: Overweight/Obesity related to lack of exercise, sedentary lifestyle, unhealthy eating habits, and unsuccessful diet attempts as evidenced by BMI. Body mass index is 34.79 kg/m².    Handouts: Protein Shakes and Bars, Cravings, Frozen Meal Guide and New Pt packet    Will follow up as necessary.    Michelle Berkowitz RD

## 2024-07-24 ENCOUNTER — HOSPITAL ENCOUNTER (OUTPATIENT)
Dept: PHYSICAL THERAPY | Age: 44
Setting detail: THERAPIES SERIES
Discharge: HOME | End: 2024-07-24
Payer: COMMERCIAL

## 2024-07-24 PROCEDURE — 97140 MANUAL THERAPY 1/> REGIONS: CPT | Performed by: PHYSICAL THERAPIST

## 2024-07-24 PROCEDURE — 97110 THERAPEUTIC EXERCISES: CPT | Performed by: PHYSICAL THERAPIST

## 2024-07-24 NOTE — FLOWSHEET NOTE
[facility]- [facility] [address], [place] [address] office: [phone] fax: [phone]     Physical Therapy: TREATMENT/PROGRESS NOTE   Patient: Kandace Rao (44 y.o. female)   Examination Date: 2024   :  1980 MRN: [id number]   Visit #: 2 Ded not met  Insurance Allowable Auth Needed   25 []Yes    [x]No    Insurance: Payor: UMR / Plan: UMR / Product Type: *No Product type* /   Insurance ID: [id number] - (Commercial)  Secondary Insurance (if applicable): [facility] MEDICAID   Treatment Diagnosis:     ICD-10-CM    1. Lumbar radiculopathy  M54.16          Medical Diagnosis:  Sciatica of left side [M54.32]   Referring Physician: Purvi Robert DO  PCP: Jennifer Vivas, APRN - CNP     Plan of care signed (Y/N): Y    Date of Patient follow up with Physician:      Progress Report/POC:   POC update due: (10 visits /OR AUTH LIMITS, whichever is less)  2024                                             Precautions/ Contra-indications:           Latex allergy:  YES  Pacemaker:    NO  Contraindications for Manipulation: None  Date of Surgery: NA  Other:    Red Flags:  None    C-SSRS Triggered by Intake questionnaire:   Patient answered 'NO' to both behavioral questions on intake.  No further screening warranted    Preferred Language for Healthcare:   [x] English       [] other:    SUBJECTIVE EXAMINATION     Patient stated complaint: Pt reports is not having the pain down her L leg the last 2 days.  HEP is going well and she has been using pillow between her knees for sleeping and that is helping a lot.  Soreness across lower back today.       Test used Initial score  2024   Pain Summary     Functional questionnaire Modified Oswestry 50%    Other:              Pain:  Pain location: L LBP/SLJ and post hip that radiates to her lateral L calf and numbness in to the foot  Patient describes pain to be constant, Sharp,  Maribeth Melendez

## 2024-07-30 ENCOUNTER — APPOINTMENT (OUTPATIENT)
Dept: PHYSICAL THERAPY | Age: 44
End: 2024-07-30
Payer: COMMERCIAL

## 2024-08-03 NOTE — PROGRESS NOTES
ENCOUNTER DATE: 8/5/2024     NAME: Kandace Rao   AGE: 44 y.o.   GENDER: female   YOB: 1980    Chief Complaint   Patient presents with    Leg Pain     Pt has started physical therapy but isnt helping     Constipation       ASSESSMENT/PLAN:  1. Left-sided low back pain with left-sided sciatica, unspecified chronicity  Continue per PT  Continue with Tylenol arthritis as needed  Trial of different muscle relaxer.  Check MRI of lumbar spine.  Question nerve involvement due to continued LLE paresthesias, weakness  Proceed pending results  - metaxalone (SKELAXIN) 800 MG tablet; Take 1 tablet by mouth 3 times daily for 10 days  Dispense: 30 tablet; Refill: 0  - MRI LUMBAR SPINE WO CONTRAST; Future    2. Left leg paresthesias  - MRI LUMBAR SPINE WO CONTRAST; Future    3. Chronic idiopathic constipation  Chronic.  Worsening  Recent MRI of pelvis reviewed  Encouraged to take stool softener daily.  If no BM x 3 days, will take laxative.  Continue with increased water intake  If no improvement, may need to see GI    4. Gastroesophageal reflux disease without esophagitis  Trial of PPI.  Likely due to increased NSAID use  - omeprazole (PRILOSEC) 20 MG delayed release capsule; Take 1 capsule by mouth every morning (before breakfast)  Dispense: 30 capsule; Refill: 0    5.  Hypertension  Chronic.  Stable  BP much improved.  Continue on amlodipine 10 mg daily      Return in about 3 weeks (around 8/26/2024) for back pain.     HPI:   Patient is here for a follow up visit    BACK PAIN  X 6-8 weeks.   Seen 7/2/24 by Dr Robert.   OMT performed, states symptoms worsened.  Dx with sciatica.   Treated with Methocarbamol, didn't help.  Did not like the way it made her feel  Taking tylenol arthritis.   Referred to Physical Therapy and is helping some. Going 2 days/wk.     MRI pelvis completed 7/15/24  Some days can't get out of bed. Back pain will improve some with BM  Left leg pain constant. Has numbness and weakness in

## 2024-08-05 ENCOUNTER — OFFICE VISIT (OUTPATIENT)
Dept: PRIMARY CARE CLINIC | Age: 44
End: 2024-08-05
Payer: COMMERCIAL

## 2024-08-05 VITALS
WEIGHT: 178 LBS | HEART RATE: 110 BPM | DIASTOLIC BLOOD PRESSURE: 82 MMHG | BODY MASS INDEX: 34.48 KG/M2 | TEMPERATURE: 97.3 F | OXYGEN SATURATION: 98 % | SYSTOLIC BLOOD PRESSURE: 126 MMHG | RESPIRATION RATE: 16 BRPM

## 2024-08-05 DIAGNOSIS — K59.04 CHRONIC IDIOPATHIC CONSTIPATION: ICD-10-CM

## 2024-08-05 DIAGNOSIS — I10 HYPERTENSION, UNSPECIFIED TYPE: ICD-10-CM

## 2024-08-05 DIAGNOSIS — R20.2 LEFT LEG PARESTHESIAS: ICD-10-CM

## 2024-08-05 DIAGNOSIS — K21.9 GASTROESOPHAGEAL REFLUX DISEASE WITHOUT ESOPHAGITIS: ICD-10-CM

## 2024-08-05 DIAGNOSIS — M54.42 LEFT-SIDED LOW BACK PAIN WITH LEFT-SIDED SCIATICA, UNSPECIFIED CHRONICITY: Primary | ICD-10-CM

## 2024-08-05 PROCEDURE — 99214 OFFICE O/P EST MOD 30 MIN: CPT | Performed by: NURSE PRACTITIONER

## 2024-08-05 PROCEDURE — 3074F SYST BP LT 130 MM HG: CPT | Performed by: NURSE PRACTITIONER

## 2024-08-05 PROCEDURE — 3079F DIAST BP 80-89 MM HG: CPT | Performed by: NURSE PRACTITIONER

## 2024-08-05 RX ORDER — METAXALONE 800 MG/1
800 TABLET ORAL 3 TIMES DAILY
Qty: 30 TABLET | Refills: 0 | Status: SHIPPED | OUTPATIENT
Start: 2024-08-05 | End: 2024-08-15

## 2024-08-05 RX ORDER — OMEPRAZOLE 20 MG/1
20 CAPSULE, DELAYED RELEASE ORAL
Qty: 30 CAPSULE | Refills: 0 | Status: SHIPPED | OUTPATIENT
Start: 2024-08-05

## 2024-08-05 ASSESSMENT — ENCOUNTER SYMPTOMS
CHEST TIGHTNESS: 0
COUGH: 0
BACK PAIN: 1
SHORTNESS OF BREATH: 0
SORE THROAT: 0
CONSTIPATION: 1
NAUSEA: 0
WHEEZING: 0
BLOOD IN STOOL: 0
DIARRHEA: 0
VOMITING: 0
ABDOMINAL PAIN: 0

## 2024-08-13 ENCOUNTER — HOSPITAL ENCOUNTER (OUTPATIENT)
Dept: MRI IMAGING | Age: 44
Discharge: HOME | End: 2024-08-13
Payer: COMMERCIAL

## 2024-08-13 DIAGNOSIS — R20.2 LEFT LEG PARESTHESIAS: ICD-10-CM

## 2024-08-13 DIAGNOSIS — M54.42 LEFT-SIDED LOW BACK PAIN WITH LEFT-SIDED SCIATICA, UNSPECIFIED CHRONICITY: ICD-10-CM

## 2024-08-13 PROCEDURE — 72148 MRI LUMBAR SPINE W/O DYE: CPT

## 2024-08-18 NOTE — PROGRESS NOTES
ENCOUNTER DATE: 8/26/2024     NAME: Kandace Rao   AGE: 44 y.o.   GENDER: female   YOB: 1980    Chief Complaint   Patient presents with    Back Pain    Vaginal Discharge     Started 4 days ago     Urinary Frequency       ASSESSMENT/PLAN:  1. Left-sided low back pain with left-sided sciatica, unspecified chronicity  MRI discussed  Continue with NSAIDs and muscle relaxer as needed  Continue with PT  Refer to orthospine for further evaluation and recommendations  - Della Escobar PA-C, Orthopedic Surgery (Spine), [place]  - metaxalone (SKELAXIN) 800 MG tablet; Take 1 tablet by mouth 3 times daily for 10 days  Dispense: 30 tablet; Refill: 2    2. Left leg paresthesias  - Della Escobar PA-C, Orthopedic Surgery (Spine), [place]    3. Urinary frequency  - POCT Urinalysis no Micro    4. Hematuria, unspecified type  Send urine for culture.  Proceed pending results  - Culture, Urine    5. Vaginal discharge  Send vaginal culture.  Proceed pending results  - Vaginal Pathogens Probes *A    6. Hypertension, unspecified type  Stable on current regimen      Return in about 3 months (around 11/26/2024) for physical.     HPI:   Patient is here for a follow-up visit    HYPERTENSION  On amlodipine 10 mg daily  stable    BACK PAIN  In Physical Therapy. Missed a few sessions.   Was doing better until she traveled 5 hrs in a car. Was in bed the past 2 days.   Pain still on left side and radiates down her left leg/hip  MRI completed showed few disc bulges  Rx'd Skelaxin and was able to tolerate this well.     CONSTIPATION  Encouraged daily stool softener and laxative as needed    GYN  Complains of vaginal dc   No odor. White/brown at times.   No pelvic pain  Has urinary frequency. No dysuria.   Some itching.   Did hurt with intercourse recently.     ROS:  Review of Systems   Constitutional:  Positive for fatigue and unexpected weight change. Negative for activity change,  Tenderness: There is no right CVA tenderness or left CVA tenderness.   Skin:     General: Skin is warm and dry.      Findings: No rash.   Neurological:      General: No focal deficit present.      Mental Status: She is alert and oriented to person, place, and time.      Sensory: Sensation is intact.      Motor: No weakness.      Gait: Gait normal.   Psychiatric:         Mood and Affect: Mood normal.         Behavior: Behavior normal.         Thought Content: Thought content normal.         Judgment: Judgment normal.          Results for POC orders placed in visit on 24   POCT Urinalysis no Micro   Result Value Ref Range    Color, UA yellow     Clarity, UA clear     Glucose, UA POC negative     Bilirubin, UA negative     Ketones, UA trace     Spec Grav, UA 1.025     Blood, UA POC trace     pH, UA 5.0     Protein, UA POC negative     Urobilinogen, UA 1.0     Leukocytes, UA negative     Nitrite, UA negative          Patient Active Problem List   Diagnosis    Eczema    Cyst of left ovary    History of classical  section    Genital herpes simplex    History of diverticulitis    Hypertension    Raynaud's phenomenon without gangrene    Hyperhidrosis    Chronic idiopathic constipation    Test anxiety    Class 1 obesity with serious comorbidity and body mass index (BMI) of 32.0 to 32.9 in adult    Uterine leiomyoma    Endometriosis    Gastroesophageal reflux disease without esophagitis      Allergies   Allergen Reactions    Latex Hives     Current Outpatient Medications on File Prior to Visit   Medication Sig Dispense Refill    omeprazole (PRILOSEC) 20 MG delayed release capsule Take 1 capsule by mouth every morning (before breakfast) 30 capsule 0    senna-docusate (PERICOLACE) 8.6-50 MG per tablet Take 1 tablet by mouth daily      triamcinolone (KENALOG) 0.1 % ointment Apply topically 2 times daily. 80 g 2    medroxyPROGESTERone (PROVERA) 10 MG tablet Take 2 tablets by mouth daily 60 tablet 1    amLODIPine

## 2024-08-26 ENCOUNTER — OFFICE VISIT (OUTPATIENT)
Dept: PRIMARY CARE CLINIC | Age: 44
End: 2024-08-26
Payer: COMMERCIAL

## 2024-08-26 VITALS
BODY MASS INDEX: 35.25 KG/M2 | DIASTOLIC BLOOD PRESSURE: 82 MMHG | RESPIRATION RATE: 16 BRPM | OXYGEN SATURATION: 97 % | TEMPERATURE: 97.6 F | HEART RATE: 95 BPM | WEIGHT: 182 LBS | SYSTOLIC BLOOD PRESSURE: 118 MMHG

## 2024-08-26 DIAGNOSIS — R31.9 HEMATURIA, UNSPECIFIED TYPE: ICD-10-CM

## 2024-08-26 DIAGNOSIS — I10 HYPERTENSION, UNSPECIFIED TYPE: ICD-10-CM

## 2024-08-26 DIAGNOSIS — R35.0 URINARY FREQUENCY: ICD-10-CM

## 2024-08-26 DIAGNOSIS — R20.2 LEFT LEG PARESTHESIAS: ICD-10-CM

## 2024-08-26 DIAGNOSIS — M54.42 LEFT-SIDED LOW BACK PAIN WITH LEFT-SIDED SCIATICA, UNSPECIFIED CHRONICITY: Primary | ICD-10-CM

## 2024-08-26 DIAGNOSIS — N89.8 VAGINAL DISCHARGE: ICD-10-CM

## 2024-08-26 LAB
BILIRUBIN, POC: NEGATIVE
BLOOD URINE, POC: NORMAL
CLARITY, POC: CLEAR
COLOR, POC: YELLOW
GLUCOSE URINE, POC: NEGATIVE
KETONES, POC: NORMAL
LEUKOCYTE EST, POC: NEGATIVE
NITRITE, POC: NEGATIVE
PH, POC: 5
PROTEIN, POC: NEGATIVE
SPECIFIC GRAVITY, POC: 1.02
UROBILINOGEN, POC: 1

## 2024-08-26 PROCEDURE — 99214 OFFICE O/P EST MOD 30 MIN: CPT | Performed by: NURSE PRACTITIONER

## 2024-08-26 PROCEDURE — 3079F DIAST BP 80-89 MM HG: CPT | Performed by: NURSE PRACTITIONER

## 2024-08-26 PROCEDURE — 3074F SYST BP LT 130 MM HG: CPT | Performed by: NURSE PRACTITIONER

## 2024-08-26 PROCEDURE — 81002 URINALYSIS NONAUTO W/O SCOPE: CPT | Performed by: NURSE PRACTITIONER

## 2024-08-26 RX ORDER — METAXALONE 800 MG/1
800 TABLET ORAL 3 TIMES DAILY
Qty: 30 TABLET | Refills: 2 | Status: SHIPPED | OUTPATIENT
Start: 2024-08-26 | End: 2024-09-05

## 2024-08-26 ASSESSMENT — ENCOUNTER SYMPTOMS
SORE THROAT: 0
COUGH: 0
WHEEZING: 0
SHORTNESS OF BREATH: 0
CONSTIPATION: 1
ABDOMINAL PAIN: 0
BACK PAIN: 1
BLOOD IN STOOL: 0
CHEST TIGHTNESS: 0
DIARRHEA: 0
NAUSEA: 0
VOMITING: 0

## 2024-08-26 ASSESSMENT — PATIENT HEALTH QUESTIONNAIRE - PHQ9
SUM OF ALL RESPONSES TO PHQ QUESTIONS 1-9: 0
2. FEELING DOWN, DEPRESSED OR HOPELESS: NOT AT ALL
SUM OF ALL RESPONSES TO PHQ QUESTIONS 1-9: 0
SUM OF ALL RESPONSES TO PHQ9 QUESTIONS 1 & 2: 0
SUM OF ALL RESPONSES TO PHQ QUESTIONS 1-9: 0
1. LITTLE INTEREST OR PLEASURE IN DOING THINGS: NOT AT ALL
SUM OF ALL RESPONSES TO PHQ QUESTIONS 1-9: 0

## 2024-08-27 DIAGNOSIS — N76.0 BACTERIAL VAGINOSIS: Primary | ICD-10-CM

## 2024-08-27 DIAGNOSIS — B96.89 BACTERIAL VAGINOSIS: Primary | ICD-10-CM

## 2024-08-27 LAB
BACTERIA UR CULT: NORMAL
CANDIDA DNA VAG QL NAA+PROBE: ABNORMAL
G VAGINALIS DNA SPEC QL NAA+PROBE: ABNORMAL
T VAGINALIS DNA VAG QL NAA+PROBE: ABNORMAL

## 2024-08-27 RX ORDER — METRONIDAZOLE 500 MG/1
500 TABLET ORAL 2 TIMES DAILY
Qty: 14 TABLET | Refills: 0 | Status: SHIPPED | OUTPATIENT
Start: 2024-08-27 | End: 2024-09-03

## 2024-09-04 ENCOUNTER — OFFICE VISIT (OUTPATIENT)
Dept: ORTHOPEDIC SURGERY | Age: 44
End: 2024-09-04
Payer: COMMERCIAL

## 2024-09-04 VITALS — BODY MASS INDEX: 35.73 KG/M2 | WEIGHT: 182 LBS | HEIGHT: 60 IN

## 2024-09-04 DIAGNOSIS — M51.36 DDD (DEGENERATIVE DISC DISEASE), LUMBAR: Primary | ICD-10-CM

## 2024-09-04 DIAGNOSIS — M54.16 LUMBAR RADICULITIS: ICD-10-CM

## 2024-09-04 DIAGNOSIS — M51.26 PROTRUSION OF LUMBAR INTERVERTEBRAL DISC: ICD-10-CM

## 2024-09-04 PROCEDURE — 99204 OFFICE O/P NEW MOD 45 MIN: CPT | Performed by: PHYSICIAN ASSISTANT

## 2024-09-04 RX ORDER — METHYLPREDNISOLONE 4 MG
TABLET, DOSE PACK ORAL
Qty: 1 KIT | Refills: 0 | Status: SHIPPED | OUTPATIENT
Start: 2024-09-04

## 2024-10-15 ENCOUNTER — OFFICE VISIT (OUTPATIENT)
Age: 44
End: 2024-10-15

## 2024-10-15 VITALS
BODY MASS INDEX: 35.73 KG/M2 | HEART RATE: 97 BPM | SYSTOLIC BLOOD PRESSURE: 144 MMHG | TEMPERATURE: 98.4 F | RESPIRATION RATE: 16 BRPM | WEIGHT: 182 LBS | OXYGEN SATURATION: 98 % | DIASTOLIC BLOOD PRESSURE: 87 MMHG | HEIGHT: 60 IN

## 2024-10-15 DIAGNOSIS — S16.1XXA ACUTE STRAIN OF NECK MUSCLE, INITIAL ENCOUNTER: ICD-10-CM
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
## 2024-10-15 RX ORDER — ACETAMINOPHEN 325 MG/1
TABLET ORAL
COMMUNITY
Start: 2023-11-20

## 2024-10-15 RX ORDER — LABETALOL 100 MG/1
1 TABLET, FILM COATED ORAL 2 TIMES DAILY
COMMUNITY

## 2024-10-15 RX ORDER — CYCLOBENZAPRINE HCL 10 MG
10 TABLET ORAL NIGHTLY PRN
Qty: 10 TABLET | Refills: 0 | Status: SHIPPED | OUTPATIENT
Start: 2024-10-15 | End: 2024-10-25

## 2024-10-15 ASSESSMENT — ENCOUNTER SYMPTOMS: RESPIRATORY NEGATIVE: 1
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
## 2024-10-23 ENCOUNTER — HOSPITAL ENCOUNTER (OUTPATIENT)
Dept: INTERVENTIONAL RADIOLOGY/VASCULAR | Age: 44
Discharge: HOME | End: 2024-10-25
Attending: OBSTETRICS & GYNECOLOGY

## 2024-10-23 DIAGNOSIS — D25.9 UTERINE LEIOMYOMA, UNSPECIFIED LOCATION: ICD-10-CM

## 2024-10-25 ENCOUNTER — HOSPITAL ENCOUNTER (OUTPATIENT)
Dept: GENERAL RADIOLOGY | Age: 44
Discharge: HOME | End: 2024-10-25
Attending: FAMILY MEDICINE
Payer: COMMERCIAL

## 2024-10-25 ENCOUNTER — HOSPITAL ENCOUNTER (OUTPATIENT)
Age: 44
Discharge: HOME | End: 2024-10-25
Payer: COMMERCIAL

## 2024-10-25 DIAGNOSIS — S16.1XXA ACUTE STRAIN OF NECK MUSCLE, INITIAL ENCOUNTER: ICD-10-CM

## 2024-10-25 PROCEDURE — 72050 X-RAY EXAM NECK SPINE 4/5VWS: CPT

## 2024-10-30 ENCOUNTER — OFFICE VISIT (OUTPATIENT)
Dept: PRIMARY CARE CLINIC | Age: 44
End: 2024-10-30
Payer: COMMERCIAL

## 2024-10-30 VITALS
RESPIRATION RATE: 16 BRPM | DIASTOLIC BLOOD PRESSURE: 88 MMHG | TEMPERATURE: 97.6 F | BODY MASS INDEX: 35.44 KG/M2 | SYSTOLIC BLOOD PRESSURE: 118 MMHG | OXYGEN SATURATION: 98 % | WEIGHT: 183 LBS | HEART RATE: 97 BPM

## 2024-10-30 DIAGNOSIS — I10 HYPERTENSION, UNSPECIFIED TYPE: ICD-10-CM

## 2024-10-30 DIAGNOSIS — H66.001 NON-RECURRENT ACUTE SUPPURATIVE OTITIS MEDIA OF RIGHT EAR WITHOUT SPONTANEOUS RUPTURE OF TYMPANIC MEMBRANE: Primary | ICD-10-CM

## 2024-10-30 PROCEDURE — 3079F DIAST BP 80-89 MM HG: CPT | Performed by: NURSE PRACTITIONER

## 2024-10-30 PROCEDURE — 99213 OFFICE O/P EST LOW 20 MIN: CPT | Performed by: NURSE PRACTITIONER

## 2024-10-30 PROCEDURE — 3074F SYST BP LT 130 MM HG: CPT | Performed by: NURSE PRACTITIONER

## 2024-10-30 RX ORDER — CETIRIZINE HYDROCHLORIDE 10 MG/1
10 TABLET ORAL DAILY
Qty: 30 TABLET | Refills: 0 | Status: SHIPPED | OUTPATIENT
Start: 2024-10-30

## 2024-10-30 RX ORDER — AMOXICILLIN 875 MG/1
875 TABLET, COATED ORAL 2 TIMES DAILY
Qty: 20 TABLET | Refills: 0 | Status: SHIPPED | OUTPATIENT
Start: 2024-10-30 | End: 2024-11-09

## 2024-10-30 ASSESSMENT — ENCOUNTER SYMPTOMS
SORE THROAT: 0
ABDOMINAL PAIN: 0
CONSTIPATION: 1
COUGH: 0
SINUS PRESSURE: 0
BACK PAIN: 1
NAUSEA: 0
WHEEZING: 0
SHORTNESS OF BREATH: 0
DIARRHEA: 0
VOMITING: 0
RHINORRHEA: 0
BLOOD IN STOOL: 0
CHEST TIGHTNESS: 0

## 2024-10-30 NOTE — PROGRESS NOTES
sounds. No wheezing, rhonchi or rales.   Lymphadenopathy:      Cervical: No cervical adenopathy.   Skin:     General: Skin is warm and dry.      Findings: No rash.   Neurological:      Mental Status: She is alert and oriented to person, place, and time.      Motor: No weakness.      Gait: Gait normal.   Psychiatric:         Mood and Affect: Mood normal.          Patient Active Problem List   Diagnosis    Eczema    Cyst of left ovary    History of classical  section    Genital herpes simplex    History of diverticulitis    Hypertension    Raynaud's phenomenon without gangrene    Hyperhidrosis    Chronic idiopathic constipation    Test anxiety    Class 1 obesity with serious comorbidity and body mass index (BMI) of 32.0 to 32.9 in adult    Uterine leiomyoma    Endometriosis    Gastroesophageal reflux disease without esophagitis      Allergies   Allergen Reactions    Latex Hives     Current Outpatient Medications on File Prior to Visit   Medication Sig Dispense Refill    acetaminophen (TYLENOL) 325 MG tablet 2 tab(s) orally every 6 hours as needed      Hexylresorcinol (THROAT LOZENGES MT) as directed Mouth/Throat      labetalol (NORMODYNE) 100 MG tablet Take 1 tablet by mouth in the morning and at bedtime      omeprazole (PRILOSEC) 20 MG delayed release capsule Take 1 capsule by mouth every morning (before breakfast) 30 capsule 0    senna-docusate (PERICOLACE) 8.6-50 MG per tablet Take 1 tablet by mouth daily      triamcinolone (KENALOG) 0.1 % ointment Apply topically 2 times daily. 80 g 2    medroxyPROGESTERone (PROVERA) 10 MG tablet Take 2 tablets by mouth daily 60 tablet 1    amLODIPine (NORVASC) 10 MG tablet Take 1 tablet by mouth daily 90 tablet 1    busPIRone (BUSPAR) 5 MG tablet Take 1-2 tabs PO BID PRN for anxiety 60 tablet 2    aluminum chloride (DRYSOL) 20 % external solution Apply topically nightly. 60 mL 5    fluticasone (FLONASE) 50 MCG/ACT nasal spray SPRAY TWO SPRAYS IN EACH NOSTRIL ONCE DAILY 1

## 2024-11-26 DIAGNOSIS — I10 HYPERTENSION, UNSPECIFIED TYPE: ICD-10-CM

## 2024-11-26 RX ORDER — AMLODIPINE BESYLATE 10 MG/1
10 TABLET ORAL DAILY
Qty: 90 TABLET | Refills: 1 | Status: SHIPPED | OUTPATIENT
Start: 2024-11-26

## 2024-11-26 NOTE — TELEPHONE ENCOUNTER
Medication:   Requested Prescriptions     Pending Prescriptions Disp Refills    amLODIPine (NORVASC) 10 MG tablet [Pharmacy Med Name: AMLODIPINE BESYLATE 10MG TABLETS] 90 tablet 1     Sig: TAKE 1 TABLET BY MOUTH EVERY DAY     Last filled: 5/17/24  Last appt: 10/30/2024   Next appt: 12/2/2024    Last OARRS:        No data to display

## 2024-11-30 NOTE — PROGRESS NOTES
ENCOUNTER DATE: 12/2/2024     NAME: Kandace Rao   AGE: 44 y.o.   GENDER: female   YOB: 1980    Chief Complaint   Patient presents with    Blood Pressure Check     Diastolic been in 90s    Foot Swelling     RT foot started a week ago        ASSESSMENT/PLAN:  1. Hypertension, unspecified type  Advised to stay on amlodipine 10 mg daily, do not increase as 10 mg is the max dose.  RLE swelling has resolved.  Advised to let me know if edema returns  Add HCTZ 12.5 mg daily.  Discussed medication in detail  Continue to monitor BP at home.  If BP still elevated after 1 week, may increase to 25 mg daily  Follow-up in 2 weeks for BP recheck  - hydroCHLOROthiazide 12.5 MG capsule; Take 1 capsule by mouth every morning  Dispense: 30 capsule; Refill: 0    2. Class 2 drug-induced obesity with serious comorbidity and body mass index (BMI) of 36.0 to 36.9 in adult  Discussed GLP-1's and weight loss medications  Patient is going to check with her insurance to see if any weight loss medications are covered  Encouraged to increase physical activity, even if walking briskly on a treadmill for 20 minutes 5 days a week.  Will also focus on dietary changes      Return in about 2 weeks (around 12/16/2024) for blood pressure.     HPI:   Patient is here for a follow up visit    HYPERTENSION  On amlodipine 10 mg daily   Has been checking BP at home: 112-118/88-90s  She took 20mg amlodipine for a few days, with no real improvement.   Has cut out caffeine  Not exercising, did just join a gym.  Diet is not great    Has been on lisinopril and labetalol in the past    FOOT  Complains of right foot swelling and tingling last week.   Cutting out caffeine, elevated RLE and is better. No further swelling in ankle or foot    WEIGHT  Interested in weight loss medications, particularly GLP-1's  Unsure if insurance covers weight loss medications    ROS:  Review of Systems   Constitutional:  Positive for fatigue and unexpected weight

## 2024-12-02 ENCOUNTER — PATIENT MESSAGE (OUTPATIENT)
Dept: PRIMARY CARE CLINIC | Age: 44
End: 2024-12-02

## 2024-12-02 ENCOUNTER — OFFICE VISIT (OUTPATIENT)
Dept: PRIMARY CARE CLINIC | Age: 44
End: 2024-12-02
Payer: COMMERCIAL

## 2024-12-02 VITALS
WEIGHT: 187 LBS | RESPIRATION RATE: 16 BRPM | HEART RATE: 93 BPM | OXYGEN SATURATION: 98 % | DIASTOLIC BLOOD PRESSURE: 92 MMHG | SYSTOLIC BLOOD PRESSURE: 118 MMHG | BODY MASS INDEX: 36.22 KG/M2 | TEMPERATURE: 97.6 F

## 2024-12-02 DIAGNOSIS — I10 HYPERTENSION, UNSPECIFIED TYPE: Primary | ICD-10-CM

## 2024-12-02 DIAGNOSIS — E66.812 CLASS 2 SEVERE OBESITY WITH SERIOUS COMORBIDITY AND BODY MASS INDEX (BMI) OF 36.0 TO 36.9 IN ADULT, UNSPECIFIED OBESITY TYPE: Primary | ICD-10-CM

## 2024-12-02 DIAGNOSIS — E66.812 CLASS 2 DRUG-INDUCED OBESITY WITH SERIOUS COMORBIDITY AND BODY MASS INDEX (BMI) OF 36.0 TO 36.9 IN ADULT: ICD-10-CM

## 2024-12-02 DIAGNOSIS — E66.01 CLASS 2 SEVERE OBESITY WITH SERIOUS COMORBIDITY AND BODY MASS INDEX (BMI) OF 36.0 TO 36.9 IN ADULT, UNSPECIFIED OBESITY TYPE: Primary | ICD-10-CM

## 2024-12-02 DIAGNOSIS — E66.1 CLASS 2 DRUG-INDUCED OBESITY WITH SERIOUS COMORBIDITY AND BODY MASS INDEX (BMI) OF 36.0 TO 36.9 IN ADULT: ICD-10-CM

## 2024-12-02 PROCEDURE — 3074F SYST BP LT 130 MM HG: CPT | Performed by: NURSE PRACTITIONER

## 2024-12-02 PROCEDURE — 3080F DIAST BP >= 90 MM HG: CPT | Performed by: NURSE PRACTITIONER

## 2024-12-02 PROCEDURE — 99214 OFFICE O/P EST MOD 30 MIN: CPT | Performed by: NURSE PRACTITIONER

## 2024-12-02 RX ORDER — TIRZEPATIDE 2.5 MG/.5ML
2.5 INJECTION, SOLUTION SUBCUTANEOUS WEEKLY
Qty: 2 ML | Refills: 0 | Status: SHIPPED | OUTPATIENT
Start: 2024-12-02

## 2024-12-02 RX ORDER — HYDROCHLOROTHIAZIDE 12.5 MG/1
12.5 CAPSULE ORAL EVERY MORNING
Qty: 30 CAPSULE | Refills: 0 | Status: SHIPPED | OUTPATIENT
Start: 2024-12-02

## 2024-12-02 ASSESSMENT — ENCOUNTER SYMPTOMS
ABDOMINAL PAIN: 0
NAUSEA: 0
VOMITING: 0
COUGH: 0
DIARRHEA: 0
BACK PAIN: 1
SORE THROAT: 0
BLOOD IN STOOL: 0
SHORTNESS OF BREATH: 0
WHEEZING: 0
CHEST TIGHTNESS: 0
CONSTIPATION: 1

## 2024-12-09 ENCOUNTER — PATIENT MESSAGE (OUTPATIENT)
Dept: PRIMARY CARE CLINIC | Age: 44
End: 2024-12-09

## 2024-12-09 DIAGNOSIS — E66.01 CLASS 2 SEVERE OBESITY WITH SERIOUS COMORBIDITY AND BODY MASS INDEX (BMI) OF 36.0 TO 36.9 IN ADULT, UNSPECIFIED OBESITY TYPE: ICD-10-CM

## 2024-12-09 DIAGNOSIS — E66.812 CLASS 2 SEVERE OBESITY WITH SERIOUS COMORBIDITY AND BODY MASS INDEX (BMI) OF 36.0 TO 36.9 IN ADULT, UNSPECIFIED OBESITY TYPE: ICD-10-CM

## 2024-12-09 RX ORDER — TIRZEPATIDE 2.5 MG/.5ML
2.5 INJECTION, SOLUTION SUBCUTANEOUS WEEKLY
Qty: 2 ML | Refills: 0 | Status: SHIPPED | OUTPATIENT
Start: 2024-12-09

## 2024-12-09 NOTE — TELEPHONE ENCOUNTER
Medication:   Requested Prescriptions     Pending Prescriptions Disp Refills    tirzepatide-weight management (ZEPBOUND) 2.5 MG/0.5ML SOAJ subCUTAneous auto-injector pen 2 mL 0     Sig: Inject 2.5 mg into the skin once a week     Last Filled:  12/02/2024    Last appt: 12/2/2024   Next appt: Visit date not found    Last OARRS:        No data to display

## 2024-12-20 ENCOUNTER — HOSPITAL ENCOUNTER (OUTPATIENT)
Dept: WOMENS IMAGING | Age: 44
Discharge: HOME | End: 2024-12-20
Payer: COMMERCIAL

## 2024-12-20 VITALS — WEIGHT: 182 LBS | HEIGHT: 62 IN | BODY MASS INDEX: 33.49 KG/M2

## 2024-12-20 DIAGNOSIS — Z12.31 BREAST CANCER SCREENING BY MAMMOGRAM: ICD-10-CM

## 2024-12-20 PROCEDURE — 77063 BREAST TOMOSYNTHESIS BI: CPT

## 2025-01-01 DIAGNOSIS — E66.812 CLASS 2 SEVERE OBESITY WITH SERIOUS COMORBIDITY AND BODY MASS INDEX (BMI) OF 36.0 TO 36.9 IN ADULT, UNSPECIFIED OBESITY TYPE: ICD-10-CM

## 2025-01-01 DIAGNOSIS — E66.01 CLASS 2 SEVERE OBESITY WITH SERIOUS COMORBIDITY AND BODY MASS INDEX (BMI) OF 36.0 TO 36.9 IN ADULT, UNSPECIFIED OBESITY TYPE: ICD-10-CM

## 2025-01-02 ENCOUNTER — PATIENT MESSAGE (OUTPATIENT)
Dept: PRIMARY CARE CLINIC | Age: 45
End: 2025-01-02

## 2025-01-02 RX ORDER — ONDANSETRON 4 MG/1
4 TABLET, ORALLY DISINTEGRATING ORAL 3 TIMES DAILY PRN
Qty: 30 TABLET | Refills: 2 | Status: SHIPPED | OUTPATIENT
Start: 2025-01-02

## 2025-01-02 RX ORDER — TIRZEPATIDE 5 MG/.5ML
INJECTION, SOLUTION SUBCUTANEOUS
OUTPATIENT
Start: 2025-01-02

## 2025-01-02 RX ORDER — TIRZEPATIDE 5 MG/.5ML
5 INJECTION, SOLUTION SUBCUTANEOUS WEEKLY
Qty: 2 ML | Refills: 1 | Status: SHIPPED | OUTPATIENT
Start: 2025-01-02

## 2025-01-02 NOTE — TELEPHONE ENCOUNTER
Please call patient and see if she wants to increase the dose to 5 mg/week or stay on 2.5 mg/week?  2.5 mg x 4 weeks is typically the starting dose.   No

## 2025-01-02 NOTE — TELEPHONE ENCOUNTER
Medication:   Requested Prescriptions     Pending Prescriptions Disp Refills    ZEPBOUND 2.5 MG/0.5ML SOAJ subCUTAneous auto-injector pen [Pharmacy Med Name: ZEPBOUND 2.5 MG/0.5 ML PEN]       Sig: INJECT 2.5 MG SUBCUTANEOUSLY WEEKLY     Last Filled:  12.9.24    Last appt: 12/2/2024   Next appt: Visit date not found    Last OARRS:        No data to display

## 2025-01-07 DIAGNOSIS — H66.001 NON-RECURRENT ACUTE SUPPURATIVE OTITIS MEDIA OF RIGHT EAR WITHOUT SPONTANEOUS RUPTURE OF TYMPANIC MEMBRANE: ICD-10-CM

## 2025-01-07 RX ORDER — CETIRIZINE HYDROCHLORIDE 10 MG/1
10 TABLET ORAL DAILY
Qty: 30 TABLET | Refills: 2 | Status: SHIPPED | OUTPATIENT
Start: 2025-01-07

## 2025-01-07 NOTE — TELEPHONE ENCOUNTER
Medication:   Requested Prescriptions     Pending Prescriptions Disp Refills    cetirizine (ZYRTEC) 10 MG tablet [Pharmacy Med Name: CETIRIZINE HCL 10 MG TABLET] 30 tablet 0     Sig: TAKE 1 TABLET BY MOUTH EVERY DAY     Last Filled:  10.30.24    Last appt: 12/2/2024   Next appt: 1/7/2025    Last OARRS:        No data to display

## 2025-01-15 DIAGNOSIS — I10 HYPERTENSION, UNSPECIFIED TYPE: ICD-10-CM

## 2025-01-15 DIAGNOSIS — K21.9 GASTROESOPHAGEAL REFLUX DISEASE WITHOUT ESOPHAGITIS: ICD-10-CM

## 2025-01-16 RX ORDER — HYDROCHLOROTHIAZIDE 12.5 MG/1
12.5 CAPSULE ORAL EVERY MORNING
Qty: 90 CAPSULE | Refills: 1 | Status: SHIPPED | OUTPATIENT
Start: 2025-01-16

## 2025-01-16 NOTE — TELEPHONE ENCOUNTER
Medication:   Requested Prescriptions     Pending Prescriptions Disp Refills    omeprazole (PRILOSEC) 20 MG delayed release capsule 30 capsule 0     Sig: Take 1 capsule by mouth every morning (before breakfast)    hydroCHLOROthiazide 12.5 MG capsule 30 capsule 0     Sig: Take 1 capsule by mouth every morning     Last Filled:  hydrochlorothiazide - 12/2/2024  Omeprazole - 8/5/2024    Last appt: 12/2/2024   Next appt: Visit date not found    Last OARRS:        No data to display

## 2025-01-20 NOTE — PROGRESS NOTES
aluminum chloride (DRYSOL) 20 % external solution Apply topically nightly. 60 mL 5    fluticasone (FLONASE) 50 MCG/ACT nasal spray SPRAY TWO SPRAYS IN EACH NOSTRIL ONCE DAILY 1 each 3     No current facility-administered medications on file prior to visit.      Social History     Tobacco Use    Smoking status: Former     Current packs/day: 0.00     Average packs/day: 1 pack/day for 3.0 years (3.0 ttl pk-yrs)     Types: Cigarettes     Start date:      Quit date:      Years since quittin.0    Smokeless tobacco: Never   Substance Use Topics    Alcohol use: Yes     Comment: occasionally        CARE TEAM  Patient Care Team:  Jennifer Vivas APRN - CNP as PCP - General (Family Nurse Practitioner)  Jennifer Vivas APRN - CNP as PCP - Empaneled Provider    Electronically signed by GEMA Blankenship CNP on 2025 at 10:28 AM     This dictation was generated by voice recognition computer software.  Although all attempts are made to edit the dictation for accuracy, there may be errors in the transcription that are not intended.

## 2025-01-22 ENCOUNTER — OFFICE VISIT (OUTPATIENT)
Dept: PRIMARY CARE CLINIC | Age: 45
End: 2025-01-22
Payer: COMMERCIAL

## 2025-01-22 VITALS
OXYGEN SATURATION: 100 % | TEMPERATURE: 97.8 F | SYSTOLIC BLOOD PRESSURE: 124 MMHG | RESPIRATION RATE: 16 BRPM | BODY MASS INDEX: 32.19 KG/M2 | HEART RATE: 85 BPM | WEIGHT: 176 LBS | DIASTOLIC BLOOD PRESSURE: 84 MMHG

## 2025-01-22 DIAGNOSIS — I10 HYPERTENSION, UNSPECIFIED TYPE: Primary | ICD-10-CM

## 2025-01-22 DIAGNOSIS — D25.9 UTERINE LEIOMYOMA, UNSPECIFIED LOCATION: ICD-10-CM

## 2025-01-22 DIAGNOSIS — N92.1 MENORRHAGIA WITH IRREGULAR CYCLE: ICD-10-CM

## 2025-01-22 DIAGNOSIS — E66.812 CLASS 2 SEVERE OBESITY WITH SERIOUS COMORBIDITY AND BODY MASS INDEX (BMI) OF 36.0 TO 36.9 IN ADULT, UNSPECIFIED OBESITY TYPE: ICD-10-CM

## 2025-01-22 DIAGNOSIS — E66.01 CLASS 2 SEVERE OBESITY WITH SERIOUS COMORBIDITY AND BODY MASS INDEX (BMI) OF 36.0 TO 36.9 IN ADULT, UNSPECIFIED OBESITY TYPE: ICD-10-CM

## 2025-01-22 PROCEDURE — 3079F DIAST BP 80-89 MM HG: CPT | Performed by: NURSE PRACTITIONER

## 2025-01-22 PROCEDURE — 99214 OFFICE O/P EST MOD 30 MIN: CPT | Performed by: NURSE PRACTITIONER

## 2025-01-22 PROCEDURE — 3074F SYST BP LT 130 MM HG: CPT | Performed by: NURSE PRACTITIONER

## 2025-01-22 SDOH — ECONOMIC STABILITY: FOOD INSECURITY: WITHIN THE PAST 12 MONTHS, THE FOOD YOU BOUGHT JUST DIDN'T LAST AND YOU DIDN'T HAVE MONEY TO GET MORE.: NEVER TRUE

## 2025-01-22 SDOH — ECONOMIC STABILITY: FOOD INSECURITY: WITHIN THE PAST 12 MONTHS, YOU WORRIED THAT YOUR FOOD WOULD RUN OUT BEFORE YOU GOT MONEY TO BUY MORE.: NEVER TRUE

## 2025-01-22 ASSESSMENT — PATIENT HEALTH QUESTIONNAIRE - PHQ9
2. FEELING DOWN, DEPRESSED OR HOPELESS: NOT AT ALL
1. LITTLE INTEREST OR PLEASURE IN DOING THINGS: NOT AT ALL
SUM OF ALL RESPONSES TO PHQ QUESTIONS 1-9: 0
SUM OF ALL RESPONSES TO PHQ9 QUESTIONS 1 & 2: 0

## 2025-01-22 ASSESSMENT — ENCOUNTER SYMPTOMS
BLOOD IN STOOL: 0
SHORTNESS OF BREATH: 0
CONSTIPATION: 1
DIARRHEA: 0
NAUSEA: 0
VOMITING: 0
CHEST TIGHTNESS: 0
SORE THROAT: 0
WHEEZING: 0
COUGH: 0
ABDOMINAL PAIN: 0
BACK PAIN: 1

## 2025-01-24 ENCOUNTER — OFFICE VISIT (OUTPATIENT)
Dept: GYNECOLOGY | Age: 45
End: 2025-01-24
Payer: COMMERCIAL

## 2025-01-24 VITALS — BODY MASS INDEX: 32.19 KG/M2 | OXYGEN SATURATION: 98 % | HEART RATE: 98 BPM | WEIGHT: 176 LBS

## 2025-01-24 DIAGNOSIS — N92.0 MENORRHAGIA WITH REGULAR CYCLE: Primary | ICD-10-CM

## 2025-01-24 DIAGNOSIS — R10.2 PELVIC PAIN: ICD-10-CM

## 2025-01-24 DIAGNOSIS — D21.9 FIBROID: ICD-10-CM

## 2025-01-24 DIAGNOSIS — N92.0 MENORRHAGIA WITH REGULAR CYCLE: ICD-10-CM

## 2025-01-24 DIAGNOSIS — N94.6 DYSMENORRHEA: ICD-10-CM

## 2025-01-24 LAB
DEPRECATED RDW RBC AUTO: 14 % (ref 12.4–15.4)
HCT VFR BLD AUTO: 41.1 % (ref 36–48)
HGB BLD-MCNC: 14 G/DL (ref 12–16)
MCH RBC QN AUTO: 33.6 PG (ref 26–34)
MCHC RBC AUTO-ENTMCNC: 34.1 G/DL (ref 31–36)
MCV RBC AUTO: 98.6 FL (ref 80–100)
PLATELET # BLD AUTO: 348 K/UL (ref 135–450)
PMV BLD AUTO: 8.9 FL (ref 5–10.5)
RBC # BLD AUTO: 4.17 M/UL (ref 4–5.2)
WBC # BLD AUTO: 8.3 K/UL (ref 4–11)

## 2025-01-24 PROCEDURE — 99214 OFFICE O/P EST MOD 30 MIN: CPT | Performed by: OBSTETRICS & GYNECOLOGY

## 2025-01-24 RX ORDER — MEDROXYPROGESTERONE ACETATE 10 MG
20 TABLET ORAL DAILY
Qty: 60 TABLET | Refills: 1 | Status: SHIPPED | OUTPATIENT
Start: 2025-01-24

## 2025-01-24 NOTE — PROGRESS NOTES
benefits reviewed.  Consent obtained.  Provera 20 mg daily.    Testing: See orders.  The patient has not had a blood count performed in a while and a CBC is ordered.    Medications: Provera  Risks & side effects, benefits, and alternatives of medications were discussed. Indications for medications are outlined in diagnoses. Benefits outweigh potential risk. Medications are prescribed as part of an overall monitoring / treatment plan with regular, scheduled follow up. The above medication counseling has been and will continue to be discussed with the patient, who agrees with the plan and voices understanding.    Management options, where appropriate, were discussed.  Diagnoses were discussed in detail; patient voiced understanding.   Future direction:  If she does not improve with current management, further work-up or treatment may be necessary.  Warnings and instructions were given.  Her questions were answered.  Kandace voices understanding.     The level of care, extent of history, examination, laboratory review, imaging review, and complexity of care is based on the patient receiving specialty services.  The extent and elements of her examination are indicated and appropriate in management. Image review, when appropriate, is done directly with the patient and her support person/family when present.  This service is being reported inclusive to the evaluation and management services.            Please note that some or all of this record was generated using voice recognition software. If there are any questions about the content of this document, please contact Dr. Blanton as some errors in transcription may have occurred.

## 2025-02-03 DIAGNOSIS — L30.9 ECZEMA, UNSPECIFIED TYPE: ICD-10-CM

## 2025-02-03 DIAGNOSIS — F41.8 TEST ANXIETY: ICD-10-CM

## 2025-02-04 RX ORDER — BUSPIRONE HYDROCHLORIDE 5 MG/1
TABLET ORAL
Qty: 60 TABLET | Refills: 5 | Status: SHIPPED | OUTPATIENT
Start: 2025-02-04

## 2025-02-04 RX ORDER — TRIAMCINOLONE ACETONIDE 1 MG/G
OINTMENT TOPICAL
Qty: 80 G | Refills: 2 | Status: SHIPPED | OUTPATIENT
Start: 2025-02-04

## 2025-02-04 NOTE — TELEPHONE ENCOUNTER
Medication:   Requested Prescriptions     Pending Prescriptions Disp Refills    triamcinolone (KENALOG) 0.1 % ointment [Pharmacy Med Name: TRIAMCINOLONE 0.1% OINTMENT] 80 g 2     Sig: APPLY TOPICALLY TO THE AFFECTED AREA TWICE A DAILY     Last Filled:  6/19/2024    Last appt: 1/22/2025   Next appt: 2/3/2025    Last OARRS:        No data to display

## 2025-02-04 NOTE — TELEPHONE ENCOUNTER
Medication:   Requested Prescriptions     Pending Prescriptions Disp Refills    busPIRone (BUSPAR) 5 MG tablet 60 tablet 2     Sig: Take 1-2 tabs PO BID PRN for anxiety     Last Filled:  1/16/2024    Last appt: 1/22/2025   Next appt: Visit date not found    Last OARRS:        No data to display

## 2025-02-07 NOTE — PROGRESS NOTES
I called infusion center where patient is scheduled. I was told a message would be sent to their prior auth team since it will be done through their center.    Maternal Grandfather     Other Paternal Grandfather         alzheimers    Breast Cancer Maternal Aunt     Ovarian Cancer Maternal Aunt        REVIEW OF SYSTEMS: Full ROS reviewed & scanned into chart  CONSTITUTIONAL: Denies unexplained weight loss, fevers   SKIN: Denies active skin conditions   ENT: Denies dizziness, nosebleeds  RESPIRATORY: Denies difficulty breathing  CARDIOVASCULAR: Denies new chest pain   NEUROLOGICAL: Denies progressive weakness  PSYCHOLOGICAL: Denies anxiety, depression   HEMATOLOGIC: Denies blood disorders, cancer  ENDOCRINE: Denies excessive thirst, heat/cold  GI: Denies current nausea, vomiting, diarrhea   : Denies new bowel or bladder incontinence       PHYSICAL EXAM:    Vitals: Height 1.53 m (5' 0.25\"), weight 82.6 kg (182 lb), not currently breastfeeding.     GENERAL EXAM:  General Apparence: Patient is adequately groomed with no evidence of malnutrition.  Orientation: The patient is oriented to time, place and person.   Mood & Affect:The patient's mood and affect are appropriate   Lymphatic: The lymphatic examination bilaterally reveals all areas to be without enlargement or induration  Sensation: Sensation is intact without deficit  Coordination/Balance: Good coordination     LUMBAR/SACRAL EXAMINATION:  Inspection: Local inspection shows no step-off or bruising.  Lumbar alignment is normal.  Sagittal and Coronal balance is neutral.      Palpation:   No evidence of tenderness at the midline.  She points left midline around L5-S1 as to where pain is located there is no focal tenderness today.  Range of Motion: Mild to moderate loss of flexion, intact extension more pain with flexion today  Strength:   Strength testing is 5/5 in all muscle groups tested.   Special Tests:   Straight leg raise and crossed SLR negative.  Leg length and pelvis level.  0 out of 5 Sameera's signs.        Skin: There are no rashes, ulcerations or lesions.  Reflexes: Reflexes are symmetrically 1-2+ at the

## 2025-02-19 ENCOUNTER — PATIENT MESSAGE (OUTPATIENT)
Dept: PRIMARY CARE CLINIC | Age: 45
End: 2025-02-19

## 2025-03-06 DIAGNOSIS — E66.01 CLASS 2 SEVERE OBESITY WITH SERIOUS COMORBIDITY AND BODY MASS INDEX (BMI) OF 36.0 TO 36.9 IN ADULT, UNSPECIFIED OBESITY TYPE: ICD-10-CM

## 2025-03-06 DIAGNOSIS — E66.812 CLASS 2 SEVERE OBESITY WITH SERIOUS COMORBIDITY AND BODY MASS INDEX (BMI) OF 36.0 TO 36.9 IN ADULT, UNSPECIFIED OBESITY TYPE: ICD-10-CM

## 2025-03-07 RX ORDER — TIRZEPATIDE 5 MG/.5ML
INJECTION, SOLUTION SUBCUTANEOUS
Qty: 2 ML | Refills: 0 | Status: SHIPPED | OUTPATIENT
Start: 2025-03-07

## 2025-03-07 NOTE — TELEPHONE ENCOUNTER
Refill sent. I would like to see patient in office for weight management follow-up sometime this month.

## 2025-03-07 NOTE — TELEPHONE ENCOUNTER
Medication:   Requested Prescriptions     Pending Prescriptions Disp Refills    ZEPBOUND 5 MG/0.5ML SOAJ subCUTAneous auto-injector pen [Pharmacy Med Name: ZEPBOUND 5 MG/0.5 ML PEN]  1     Sig: INJECT 5 MG SUBCUTANEOUSLY WEEKLY     Last filled: 1/2/25  Last appt: 1/22/2025   Next appt: Visit date not found    Last OARRS:        No data to display

## 2025-03-11 ENCOUNTER — TELEMEDICINE (OUTPATIENT)
Dept: PRIMARY CARE CLINIC | Age: 45
End: 2025-03-11
Payer: COMMERCIAL

## 2025-03-11 DIAGNOSIS — J01.90 ACUTE NON-RECURRENT SINUSITIS, UNSPECIFIED LOCATION: Primary | ICD-10-CM

## 2025-03-11 DIAGNOSIS — Z20.828 EXPOSURE TO THE FLU: ICD-10-CM

## 2025-03-11 PROCEDURE — 99213 OFFICE O/P EST LOW 20 MIN: CPT | Performed by: NURSE PRACTITIONER

## 2025-03-11 RX ORDER — GUAIFENESIN 600 MG/1
600 TABLET, EXTENDED RELEASE ORAL 2 TIMES DAILY
Qty: 30 TABLET | Refills: 0 | Status: SHIPPED | OUTPATIENT
Start: 2025-03-11 | End: 2025-03-26

## 2025-03-11 RX ORDER — AMOXICILLIN 875 MG/1
875 TABLET, COATED ORAL 2 TIMES DAILY
Qty: 20 TABLET | Refills: 0 | Status: SHIPPED | OUTPATIENT
Start: 2025-03-11 | End: 2025-03-21

## 2025-03-11 NOTE — PROGRESS NOTES
N, APRN - CNP   amLODIPine (NORVASC) 10 MG tablet TAKE 1 TABLET BY MOUTH EVERY DAY  Jennifer Vivas APRN - CNP   acetaminophen (TYLENOL) 325 MG tablet 2 tab(s) orally every 6 hours as needed  Maya Díaz MD   Hexylresorcinol (THROAT LOZENGES MT) as directed Mouth/Throat  Maya Díaz MD   labetalol (NORMODYNE) 100 MG tablet Take 1 tablet by mouth in the morning and at bedtime  Maya Díaz MD   senna-docusate (PERICOLACE) 8.6-50 MG per tablet Take 1 tablet by mouth daily  Maya Díaz MD   aluminum chloride (DRYSOL) 20 % external solution Apply topically nightly.  Jennifer Vivas APRN - CNP   fluticasone (FLONASE) 50 MCG/ACT nasal spray SPRAY TWO SPRAYS IN EACH NOSTRIL ONCE DAILY  Jennifer Vivas APRN - CNP       Social History     Tobacco Use    Smoking status: Former     Current packs/day: 0.00     Average packs/day: 1 pack/day for 3.0 years (3.0 ttl pk-yrs)     Types: Cigarettes     Start date:      Quit date:      Years since quittin.2    Smokeless tobacco: Never   Vaping Use    Vaping status: Never Used   Substance Use Topics    Alcohol use: Yes     Comment: occasionally    Drug use: No        Allergies   Allergen Reactions    Latex Hives   ,   Past Medical History:   Diagnosis Date    Diverticulitis     Diverticulosis     H/O Raynaud's syndrome 1/10/2022    Hypertension        PHYSICAL EXAMINATION:  [ INSTRUCTIONS:  \"[x]\" Indicates a positive item  \"[]\" Indicates a negative item  -- DELETE ALL ITEMS NOT EXAMINED]  Vital Signs: (As obtained by patient/caregiver or practitioner observation)    Blood pressure-  Heart rate-    Respiratory rate-    Temperature-  Pulse oximetry-     Constitutional: [x] Appears well-developed and well-nourished [x] No apparent distress      [] Abnormal-   Mental status  [x] Alert and awake  [x] Oriented to person/place/time [x]Able to follow commands      Eyes:  EOM    []  Normal  [] Abnormal-  Sclera  []  Normal  [] Abnormal

## 2025-03-30 NOTE — PROGRESS NOTES
ENCOUNTER DATE: 4/1/2025     NAME: Kandace Rao   AGE: 45 y.o.   GENDER: female   YOB: 1980    Chief Complaint   Patient presents with    Pelvic Pain    Weight Management    Medication Check       ASSESSMENT/PLAN:  1. Class 2 severe obesity with serious comorbidity and body mass index (BMI) of 36.0 to 36.9 in adult, unspecified obesity type  Previous or recent weight loss  Continue with dietary and exercise efforts  May increase Zepbound dose to 7.5 mg/week after she completes the round of 5 mg/week dose  Follow-up in 3 months  - tirzepatide-weight management (ZEPBOUND) 7.5 MG/0.5ML SOAJ subCUTAneous auto-injector pen; Inject 7.5 mg into the skin every 7 days  Dispense: 2 mL; Refill: 1    2. Pelvic pain  Worsening.  Multiple GYN issues  GYN notes reviewed  Continue per GYN  Will likely have a hysterectomy in the near future  Urine dip clear  - POCT Urinalysis no Micro    3. Hypertension, unspecified type  Stable on current regimen    4. Menorrhagia with irregular cycle  Continue per GYN        Return in about 3 months (around 7/1/2025) for physical.     HPI:   Patient is here for follow-up visit and weight management    WEIGHT  Starting weight 187  On Zepbound 5 mg/week  Has held it x 2 weeks. Restarted it last wk.   Tolerating well.   Doing pilates once/wk. Walking 2-3 times per wk.   Making healither choices  Would like to increase dose after she completes the 5 mg/week    HYPERTENSION  On amlodipine 10 mg daily  Added HCTZ 12.5 mg daily, however no longer taking. No fluid retention.    GYN  Saw Dr. Baker 1/24/2025  Recommended hysterectomy.  Referred to Dr. Renteria.   Office had to reschedule appointment  Scheduled this month.     SICK  Treated with amoxicillin 875mg bid x 10 days  Still feels congested, but better.   Drainage has improved. Sinus pressure has improved  Drinking lemon grass tea  No cough.   Started having right pelvic pain yesterday. Lower back pain.   Will start her period

## 2025-04-01 ENCOUNTER — OFFICE VISIT (OUTPATIENT)
Dept: PRIMARY CARE CLINIC | Age: 45
End: 2025-04-01
Payer: COMMERCIAL

## 2025-04-01 VITALS
BODY MASS INDEX: 30.91 KG/M2 | DIASTOLIC BLOOD PRESSURE: 86 MMHG | HEART RATE: 100 BPM | WEIGHT: 169 LBS | TEMPERATURE: 97.8 F | SYSTOLIC BLOOD PRESSURE: 128 MMHG | RESPIRATION RATE: 16 BRPM | OXYGEN SATURATION: 99 %

## 2025-04-01 DIAGNOSIS — E66.812 CLASS 2 SEVERE OBESITY WITH SERIOUS COMORBIDITY AND BODY MASS INDEX (BMI) OF 36.0 TO 36.9 IN ADULT, UNSPECIFIED OBESITY TYPE: Primary | ICD-10-CM

## 2025-04-01 DIAGNOSIS — N92.1 MENORRHAGIA WITH IRREGULAR CYCLE: ICD-10-CM

## 2025-04-01 DIAGNOSIS — I10 HYPERTENSION, UNSPECIFIED TYPE: ICD-10-CM

## 2025-04-01 DIAGNOSIS — E66.01 CLASS 2 SEVERE OBESITY WITH SERIOUS COMORBIDITY AND BODY MASS INDEX (BMI) OF 36.0 TO 36.9 IN ADULT, UNSPECIFIED OBESITY TYPE: Primary | ICD-10-CM

## 2025-04-01 DIAGNOSIS — R10.2 PELVIC PAIN: ICD-10-CM

## 2025-04-01 LAB
BILIRUBIN, POC: NEGATIVE
BLOOD URINE, POC: NORMAL
CLARITY, POC: CLEAR
COLOR, POC: YELLOW
GLUCOSE URINE, POC: NEGATIVE MG/DL
KETONES, POC: NEGATIVE MG/DL
LEUKOCYTE EST, POC: NEGATIVE
NITRITE, POC: NEGATIVE
PH, POC: 5.5
PROTEIN, POC: NEGATIVE MG/DL
SPECIFIC GRAVITY, POC: <=1.005
UROBILINOGEN, POC: 1 MG/DL

## 2025-04-01 PROCEDURE — 99214 OFFICE O/P EST MOD 30 MIN: CPT | Performed by: NURSE PRACTITIONER

## 2025-04-01 PROCEDURE — 3079F DIAST BP 80-89 MM HG: CPT | Performed by: NURSE PRACTITIONER

## 2025-04-01 PROCEDURE — 3074F SYST BP LT 130 MM HG: CPT | Performed by: NURSE PRACTITIONER

## 2025-04-01 PROCEDURE — 81002 URINALYSIS NONAUTO W/O SCOPE: CPT | Performed by: NURSE PRACTITIONER

## 2025-04-01 ASSESSMENT — ENCOUNTER SYMPTOMS
CHEST TIGHTNESS: 0
CONSTIPATION: 1
WHEEZING: 0
SHORTNESS OF BREATH: 0
BLOOD IN STOOL: 0
ABDOMINAL PAIN: 0
COUGH: 0
NAUSEA: 0
VOMITING: 0
SORE THROAT: 0
BACK PAIN: 1
DIARRHEA: 0

## 2025-04-05 DIAGNOSIS — H66.001 NON-RECURRENT ACUTE SUPPURATIVE OTITIS MEDIA OF RIGHT EAR WITHOUT SPONTANEOUS RUPTURE OF TYMPANIC MEMBRANE: ICD-10-CM

## 2025-04-07 RX ORDER — CETIRIZINE HYDROCHLORIDE 10 MG/1
10 TABLET ORAL DAILY
Qty: 90 TABLET | Refills: 1 | Status: SHIPPED | OUTPATIENT
Start: 2025-04-07

## 2025-04-07 NOTE — TELEPHONE ENCOUNTER
Medication:   Requested Prescriptions     Pending Prescriptions Disp Refills    cetirizine (ZYRTEC) 10 MG tablet [Pharmacy Med Name: CETIRIZINE HCL 10 MG TABLET] 90 tablet      Sig: TAKE 1 TABLET BY MOUTH EVERY DAY     Last filled: 1/7/25  Last appt: 4/1/2025   Next appt: Visit date not found    Last OARRS:        No data to display

## 2025-05-01 ENCOUNTER — PATIENT MESSAGE (OUTPATIENT)
Dept: PRIMARY CARE CLINIC | Age: 45
End: 2025-05-01

## 2025-06-07 DIAGNOSIS — E66.812 CLASS 2 SEVERE OBESITY WITH SERIOUS COMORBIDITY AND BODY MASS INDEX (BMI) OF 36.0 TO 36.9 IN ADULT, UNSPECIFIED OBESITY TYPE (HCC): ICD-10-CM

## 2025-06-07 DIAGNOSIS — E66.01 CLASS 2 SEVERE OBESITY WITH SERIOUS COMORBIDITY AND BODY MASS INDEX (BMI) OF 36.0 TO 36.9 IN ADULT, UNSPECIFIED OBESITY TYPE (HCC): ICD-10-CM

## 2025-06-08 ENCOUNTER — PATIENT MESSAGE (OUTPATIENT)
Dept: PRIMARY CARE CLINIC | Age: 45
End: 2025-06-08

## 2025-06-08 NOTE — TELEPHONE ENCOUNTER
Medication:   Requested Prescriptions     Pending Prescriptions Disp Refills    ZEPBOUND 7.5 MG/0.5ML SOAJ subCUTAneous auto-injector pen [Pharmacy Med Name: ZEPBOUND 7.5 MG/0.5 ML PEN]  1     Sig: INJECT 7.5 MG SUBCUTANEOUSLY EVERY 7 DAYS     Last Filled: 4/1/25     Last appt: 4/1/2025   Next appt: Visit date not found - due in 7/25 - sent scheduling ticket    Last Labs DM:   Lab Results   Component Value Date/Time    LABA1C 5.0 02/02/2021 11:40 AM     Last Lipid:   Lab Results   Component Value Date/Time    CHOL 182 10/31/2023 09:00 AM    TRIG 58 10/31/2023 09:00 AM    HDL 48 10/31/2023 09:00 AM     Last PSA: No results found for: \"PSA\"  Last Thyroid:   Lab Results   Component Value Date/Time    TSH 0.70 05/21/2024 10:13 AM

## 2025-06-09 RX ORDER — TIRZEPATIDE 7.5 MG/.5ML
INJECTION, SOLUTION SUBCUTANEOUS
Qty: 2 ML | Refills: 1 | Status: SHIPPED | OUTPATIENT
Start: 2025-06-09

## 2025-06-29 NOTE — PROGRESS NOTES
ENCOUNTER DATE: 7/2/2025     NAME: Kandace Rao   AGE: 45 y.o.   GENDER: female   YOB: 1980    Chief Complaint   Patient presents with    Annual Exam       ASSESSMENT/PLAN:  1. Annual physical exam  Patient will return for fasting labs.  Orders entered  Pap up-to-date  Mammogram up-to-date  Colon cancer screening up-to-date  Tdap up-to-date  Unsure of hep B status    - CBC with Auto Differential; Future  - Comprehensive Metabolic Panel; Future  - Lipid Panel; Future  - TSH; Future    2. Class 2 severe obesity with serious comorbidity and body mass index (BMI) of 36.0 to 36.9 in adult, unspecified obesity type (HCC)  Continue with Zepbound 7.5 mg/week  Continue with dietary and exercise efforts  Follow-up in 3 months for weight management  - Comprehensive Metabolic Panel; Future  - Lipid Panel; Future  - TSH; Future  - Vitamin D 25 Hydroxy; Future    3. Hypertension, unspecified type  Stable on current regimen  Will monitor BP at home for low readings  If SBP <110 and symptomatic, okay to decrease amlodipine to 5 mg daily  - CBC with Auto Differential; Future  - Comprehensive Metabolic Panel; Future  - Lipid Panel; Future  - amLODIPine (NORVASC) 10 MG tablet; Take 1 tablet by mouth daily  Dispense: 90 tablet; Refill: 1    4. Hair loss  Check additional labs  - Ferritin; Future  - Iron and TIBC; Future  - TSH; Future  - Vitamin B12; Future    5. Iron deficiency  - Ferritin; Future  - Iron and TIBC; Future  - CBC with Auto Differential; Future    6. Menorrhagia with irregular cycle  Saw GYN as well as Dr. Newman  Discussed hysterectomy.  Discussed Lupron injections.  Patient declined        Return in about 3 months (around 10/2/2025) for Weight management.     HPI:   Patient is here for a physical and weight management    GYN  Last pap 10/2023  History of endometriosis heavy periods, left ovarian cyst and fibroids  Saw Dr Newman.   Saw specialist. 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  Recommended Lupron

## 2025-07-02 ENCOUNTER — OFFICE VISIT (OUTPATIENT)
Dept: PRIMARY CARE CLINIC | Age: 45
End: 2025-07-02
Payer: COMMERCIAL

## 2025-07-02 VITALS
DIASTOLIC BLOOD PRESSURE: 86 MMHG | WEIGHT: 159 LBS | SYSTOLIC BLOOD PRESSURE: 114 MMHG | OXYGEN SATURATION: 99 % | BODY MASS INDEX: 29.08 KG/M2 | HEART RATE: 90 BPM | TEMPERATURE: 97.6 F

## 2025-07-02 DIAGNOSIS — E66.01 CLASS 2 SEVERE OBESITY WITH SERIOUS COMORBIDITY AND BODY MASS INDEX (BMI) OF 36.0 TO 36.9 IN ADULT, UNSPECIFIED OBESITY TYPE (HCC): ICD-10-CM

## 2025-07-02 DIAGNOSIS — Z00.00 ANNUAL PHYSICAL EXAM: Primary | ICD-10-CM

## 2025-07-02 DIAGNOSIS — L65.9 HAIR LOSS: ICD-10-CM

## 2025-07-02 DIAGNOSIS — E66.812 CLASS 2 SEVERE OBESITY WITH SERIOUS COMORBIDITY AND BODY MASS INDEX (BMI) OF 36.0 TO 36.9 IN ADULT, UNSPECIFIED OBESITY TYPE (HCC): ICD-10-CM

## 2025-07-02 DIAGNOSIS — N92.1 MENORRHAGIA WITH IRREGULAR CYCLE: ICD-10-CM

## 2025-07-02 DIAGNOSIS — I10 HYPERTENSION, UNSPECIFIED TYPE: ICD-10-CM

## 2025-07-02 DIAGNOSIS — E61.1 IRON DEFICIENCY: ICD-10-CM

## 2025-07-02 PROCEDURE — 3079F DIAST BP 80-89 MM HG: CPT | Performed by: NURSE PRACTITIONER

## 2025-07-02 PROCEDURE — 99396 PREV VISIT EST AGE 40-64: CPT | Performed by: NURSE PRACTITIONER

## 2025-07-02 PROCEDURE — 3074F SYST BP LT 130 MM HG: CPT | Performed by: NURSE PRACTITIONER

## 2025-07-02 RX ORDER — AMLODIPINE BESYLATE 10 MG/1
10 TABLET ORAL DAILY
Qty: 90 TABLET | Refills: 1 | Status: SHIPPED | OUTPATIENT
Start: 2025-07-02

## 2025-07-02 ASSESSMENT — PATIENT HEALTH QUESTIONNAIRE - PHQ9
SUM OF ALL RESPONSES TO PHQ QUESTIONS 1-9: 0
SUM OF ALL RESPONSES TO PHQ QUESTIONS 1-9: 0
1. LITTLE INTEREST OR PLEASURE IN DOING THINGS: NOT AT ALL
SUM OF ALL RESPONSES TO PHQ QUESTIONS 1-9: 0
2. FEELING DOWN, DEPRESSED OR HOPELESS: NOT AT ALL
SUM OF ALL RESPONSES TO PHQ QUESTIONS 1-9: 0

## 2025-07-02 ASSESSMENT — ENCOUNTER SYMPTOMS
SORE THROAT: 0
DIARRHEA: 0
BACK PAIN: 1
VOMITING: 0
NAUSEA: 0
ABDOMINAL PAIN: 0
COUGH: 0
CHEST TIGHTNESS: 0
SHORTNESS OF BREATH: 0
BLOOD IN STOOL: 0
CONSTIPATION: 1
WHEEZING: 0

## 2025-07-29 DIAGNOSIS — E61.1 IRON DEFICIENCY: ICD-10-CM

## 2025-07-29 DIAGNOSIS — I10 HYPERTENSION, UNSPECIFIED TYPE: ICD-10-CM

## 2025-07-29 DIAGNOSIS — L65.9 HAIR LOSS: ICD-10-CM

## 2025-07-29 DIAGNOSIS — Z00.00 ANNUAL PHYSICAL EXAM: ICD-10-CM

## 2025-07-29 DIAGNOSIS — E66.01 CLASS 2 SEVERE OBESITY WITH SERIOUS COMORBIDITY AND BODY MASS INDEX (BMI) OF 36.0 TO 36.9 IN ADULT, UNSPECIFIED OBESITY TYPE (HCC): ICD-10-CM

## 2025-07-29 DIAGNOSIS — E66.812 CLASS 2 SEVERE OBESITY WITH SERIOUS COMORBIDITY AND BODY MASS INDEX (BMI) OF 36.0 TO 36.9 IN ADULT, UNSPECIFIED OBESITY TYPE (HCC): ICD-10-CM

## 2025-07-29 LAB
25(OH)D3 SERPL-MCNC: 29.2 NG/ML
ALBUMIN SERPL-MCNC: 4.2 G/DL (ref 3.4–5)
ALBUMIN/GLOB SERPL: 1.4 {RATIO} (ref 1.1–2.2)
ALP SERPL-CCNC: 99 U/L (ref 40–129)
ALT SERPL-CCNC: 16 U/L (ref 10–40)
ANION GAP SERPL CALCULATED.3IONS-SCNC: 10 MMOL/L (ref 3–16)
AST SERPL-CCNC: 19 U/L (ref 15–37)
BASOPHILS # BLD: 0 K/UL (ref 0–0.2)
BASOPHILS NFR BLD: 0.4 %
BILIRUB SERPL-MCNC: 0.6 MG/DL (ref 0–1)
BUN SERPL-MCNC: 11 MG/DL (ref 7–20)
CALCIUM SERPL-MCNC: 9.5 MG/DL (ref 8.3–10.6)
CHLORIDE SERPL-SCNC: 105 MMOL/L (ref 99–110)
CHOLEST SERPL-MCNC: 197 MG/DL (ref 0–199)
CO2 SERPL-SCNC: 23 MMOL/L (ref 21–32)
CREAT SERPL-MCNC: 0.7 MG/DL (ref 0.6–1.1)
DEPRECATED RDW RBC AUTO: 14.4 % (ref 12.4–15.4)
EOSINOPHIL # BLD: 0.2 K/UL (ref 0–0.6)
EOSINOPHIL NFR BLD: 2.4 %
FERRITIN SERPL IA-MCNC: 102 NG/ML (ref 15–150)
GFR SERPLBLD CREATININE-BSD FMLA CKD-EPI: >90 ML/MIN/{1.73_M2}
GLUCOSE SERPL-MCNC: 89 MG/DL (ref 70–99)
HCT VFR BLD AUTO: 38.8 % (ref 36–48)
HDLC SERPL-MCNC: 56 MG/DL (ref 40–60)
HGB BLD-MCNC: 13.5 G/DL (ref 12–16)
IRON SATN MFR SERPL: 27 % (ref 15–50)
IRON SERPL-MCNC: 81 UG/DL (ref 37–145)
LDLC SERPL CALC-MCNC: 125 MG/DL
LYMPHOCYTES # BLD: 2.5 K/UL (ref 1–5.1)
LYMPHOCYTES NFR BLD: 28.5 %
MCH RBC QN AUTO: 33.9 PG (ref 26–34)
MCHC RBC AUTO-ENTMCNC: 34.6 G/DL (ref 31–36)
MCV RBC AUTO: 97.9 FL (ref 80–100)
MONOCYTES # BLD: 0.6 K/UL (ref 0–1.3)
MONOCYTES NFR BLD: 6.5 %
NEUTROPHILS # BLD: 5.6 K/UL (ref 1.7–7.7)
NEUTROPHILS NFR BLD: 62.2 %
PLATELET # BLD AUTO: 302 K/UL (ref 135–450)
PMV BLD AUTO: 8.8 FL (ref 5–10.5)
POTASSIUM SERPL-SCNC: 4.2 MMOL/L (ref 3.5–5.1)
PROT SERPL-MCNC: 7.1 G/DL (ref 6.4–8.2)
RBC # BLD AUTO: 3.97 M/UL (ref 4–5.2)
SODIUM SERPL-SCNC: 138 MMOL/L (ref 136–145)
TIBC SERPL-MCNC: 301 UG/DL (ref 260–445)
TRIGL SERPL-MCNC: 81 MG/DL (ref 0–150)
TSH SERPL DL<=0.005 MIU/L-ACNC: 1.16 UIU/ML (ref 0.27–4.2)
VIT B12 SERPL-MCNC: 730 PG/ML (ref 211–911)
VLDLC SERPL CALC-MCNC: 16 MG/DL
WBC # BLD AUTO: 8.9 K/UL (ref 4–11)

## 2025-08-13 DIAGNOSIS — E66.812 CLASS 2 SEVERE OBESITY WITH SERIOUS COMORBIDITY AND BODY MASS INDEX (BMI) OF 36.0 TO 36.9 IN ADULT, UNSPECIFIED OBESITY TYPE (HCC): ICD-10-CM

## 2025-08-13 DIAGNOSIS — E66.01 CLASS 2 SEVERE OBESITY WITH SERIOUS COMORBIDITY AND BODY MASS INDEX (BMI) OF 36.0 TO 36.9 IN ADULT, UNSPECIFIED OBESITY TYPE (HCC): ICD-10-CM

## 2025-08-14 RX ORDER — TIRZEPATIDE 7.5 MG/.5ML
INJECTION, SOLUTION SUBCUTANEOUS
Qty: 2 ML | Refills: 1 | Status: SHIPPED | OUTPATIENT
Start: 2025-08-14

## 2025-08-15 DIAGNOSIS — E66.812 CLASS 2 SEVERE OBESITY WITH SERIOUS COMORBIDITY AND BODY MASS INDEX (BMI) OF 36.0 TO 36.9 IN ADULT, UNSPECIFIED OBESITY TYPE (HCC): ICD-10-CM

## 2025-08-15 DIAGNOSIS — E66.01 CLASS 2 SEVERE OBESITY WITH SERIOUS COMORBIDITY AND BODY MASS INDEX (BMI) OF 36.0 TO 36.9 IN ADULT, UNSPECIFIED OBESITY TYPE (HCC): ICD-10-CM

## 2025-08-18 ENCOUNTER — TELEPHONE (OUTPATIENT)
Dept: PRIMARY CARE CLINIC | Age: 45
End: 2025-08-18

## 2025-08-18 ENCOUNTER — PATIENT MESSAGE (OUTPATIENT)
Dept: PRIMARY CARE CLINIC | Age: 45
End: 2025-08-18

## 2025-08-19 RX ORDER — TIRZEPATIDE 7.5 MG/.5ML
INJECTION, SOLUTION SUBCUTANEOUS
Qty: 2 ML | Refills: 1 | Status: SHIPPED | OUTPATIENT
Start: 2025-08-19

## 2025-08-25 DIAGNOSIS — K21.9 GASTROESOPHAGEAL REFLUX DISEASE WITHOUT ESOPHAGITIS: ICD-10-CM

## 2025-08-25 RX ORDER — OMEPRAZOLE 20 MG/1
20 CAPSULE, DELAYED RELEASE ORAL
Qty: 90 CAPSULE | Refills: 1 | Status: SHIPPED | OUTPATIENT
Start: 2025-08-25

## (undated) DEVICE — CANNULA SAMP CO2 AD GRN 7FT CO2 AND 7FT O2 TBNG UNIV CONN